# Patient Record
Sex: FEMALE | Race: WHITE | NOT HISPANIC OR LATINO | Employment: FULL TIME | URBAN - METROPOLITAN AREA
[De-identification: names, ages, dates, MRNs, and addresses within clinical notes are randomized per-mention and may not be internally consistent; named-entity substitution may affect disease eponyms.]

---

## 2023-02-19 PROBLEM — N60.19 FIBROCYSTIC BREAST DISEASE (FCBD) IN FEMALE: Status: ACTIVE | Noted: 2023-02-19

## 2023-02-19 PROBLEM — J45.909 ASTHMA (HHS-HCC): Status: ACTIVE | Noted: 2023-02-19

## 2023-02-19 PROBLEM — E55.9 VITAMIN D DEFICIENCY: Status: ACTIVE | Noted: 2023-02-19

## 2023-02-19 PROBLEM — Z87.19 HISTORY OF ESOPHAGEAL STRICTURE: Status: ACTIVE | Noted: 2023-02-19

## 2023-02-19 PROBLEM — I10 ESSENTIAL HYPERTENSION: Status: ACTIVE | Noted: 2023-02-19

## 2023-02-19 PROBLEM — N64.4 BREAST PAIN IN FEMALE: Status: ACTIVE | Noted: 2023-02-19

## 2023-02-19 PROBLEM — N86 CERVICAL ECTROPION: Status: ACTIVE | Noted: 2023-02-19

## 2023-02-19 PROBLEM — E78.5 HYPERLIPIDEMIA: Status: ACTIVE | Noted: 2023-02-19

## 2023-02-19 PROBLEM — N94.12 DEEP DYSPAREUNIA IN FEMALE: Status: ACTIVE | Noted: 2023-02-19

## 2023-02-19 PROBLEM — E66.9 OBESITY, CLASS II, BMI 35-39.9: Status: ACTIVE | Noted: 2023-02-19

## 2023-02-19 PROBLEM — R19.7 DIARRHEA: Status: ACTIVE | Noted: 2023-02-19

## 2023-02-19 PROBLEM — E66.812 OBESITY, CLASS II, BMI 35-39.9: Status: ACTIVE | Noted: 2023-02-19

## 2023-02-19 RX ORDER — PANTOPRAZOLE SODIUM 20 MG/1
TABLET, DELAYED RELEASE ORAL
COMMUNITY
Start: 2021-03-02 | End: 2023-10-11 | Stop reason: SDUPTHER

## 2023-02-19 RX ORDER — ALBUTEROL SULFATE 1.25 MG/3ML
SOLUTION RESPIRATORY (INHALATION)
COMMUNITY

## 2023-02-19 RX ORDER — CETIRIZINE HYDROCHLORIDE 10 MG/1
TABLET ORAL
COMMUNITY

## 2023-02-19 RX ORDER — PNV NO.95/FERROUS FUM/FOLIC AC 28MG-0.8MG
TABLET ORAL
COMMUNITY
Start: 2021-03-02 | End: 2023-08-02 | Stop reason: ALTCHOICE

## 2023-02-19 RX ORDER — NORETHINDRONE 0.35 MG/1
1 TABLET ORAL DAILY
COMMUNITY
Start: 2021-05-05 | End: 2024-05-21

## 2023-02-19 RX ORDER — VIT C/E/ZN/COPPR/LUTEIN/ZEAXAN 250MG-90MG
CAPSULE ORAL
COMMUNITY

## 2023-02-19 RX ORDER — LOSARTAN POTASSIUM 25 MG/1
1 TABLET ORAL DAILY
COMMUNITY
Start: 2022-04-08 | End: 2023-05-15

## 2023-02-19 RX ORDER — CALCIUM CARBONATE 300MG(750)
TABLET,CHEWABLE ORAL
COMMUNITY
Start: 2021-03-02 | End: 2023-08-02 | Stop reason: ALTCHOICE

## 2023-02-19 RX ORDER — ACETAMINOPHEN 325 MG/1
TABLET ORAL
COMMUNITY

## 2023-03-10 LAB
ALANINE AMINOTRANSFERASE (SGPT) (U/L) IN SER/PLAS: 45 U/L (ref 7–45)
ALBUMIN (G/DL) IN SER/PLAS: 4.2 G/DL (ref 3.4–5)
ALKALINE PHOSPHATASE (U/L) IN SER/PLAS: 42 U/L (ref 33–110)
ANION GAP IN SER/PLAS: 11 MMOL/L (ref 10–20)
ASPARTATE AMINOTRANSFERASE (SGOT) (U/L) IN SER/PLAS: 20 U/L (ref 9–39)
BASOPHILS (10*3/UL) IN BLOOD BY AUTOMATED COUNT: 0.05 X10E9/L (ref 0–0.1)
BASOPHILS/100 LEUKOCYTES IN BLOOD BY AUTOMATED COUNT: 0.6 % (ref 0–2)
BILIRUBIN TOTAL (MG/DL) IN SER/PLAS: 0.5 MG/DL (ref 0–1.2)
CALCIDIOL (25 OH VITAMIN D3) (NG/ML) IN SER/PLAS: 99 NG/ML
CALCIUM (MG/DL) IN SER/PLAS: 9.2 MG/DL (ref 8.6–10.3)
CARBON DIOXIDE, TOTAL (MMOL/L) IN SER/PLAS: 24 MMOL/L (ref 21–32)
CHLORIDE (MMOL/L) IN SER/PLAS: 105 MMOL/L (ref 98–107)
CHOLESTEROL (MG/DL) IN SER/PLAS: 195 MG/DL (ref 0–199)
CHOLESTEROL IN HDL (MG/DL) IN SER/PLAS: 39.8 MG/DL
CHOLESTEROL/HDL RATIO: 4.9
CREATININE (MG/DL) IN SER/PLAS: 0.66 MG/DL (ref 0.5–1.05)
EOSINOPHILS (10*3/UL) IN BLOOD BY AUTOMATED COUNT: 0.17 X10E9/L (ref 0–0.7)
EOSINOPHILS/100 LEUKOCYTES IN BLOOD BY AUTOMATED COUNT: 1.9 % (ref 0–6)
ERYTHROCYTE DISTRIBUTION WIDTH (RATIO) BY AUTOMATED COUNT: 12.3 % (ref 11.5–14.5)
ERYTHROCYTE MEAN CORPUSCULAR HEMOGLOBIN CONCENTRATION (G/DL) BY AUTOMATED: 32.8 G/DL (ref 32–36)
ERYTHROCYTE MEAN CORPUSCULAR VOLUME (FL) BY AUTOMATED COUNT: 84 FL (ref 80–100)
ERYTHROCYTES (10*6/UL) IN BLOOD BY AUTOMATED COUNT: 4.46 X10E12/L (ref 4–5.2)
ESTIMATED AVERAGE GLUCOSE FOR HBA1C: 100 MG/DL
GFR FEMALE: >90 ML/MIN/1.73M2
GLUCOSE (MG/DL) IN SER/PLAS: 91 MG/DL (ref 74–99)
HEMATOCRIT (%) IN BLOOD BY AUTOMATED COUNT: 37.5 % (ref 36–46)
HEMOGLOBIN (G/DL) IN BLOOD: 12.3 G/DL (ref 12–16)
HEMOGLOBIN A1C/HEMOGLOBIN TOTAL IN BLOOD: 5.1 %
IMMATURE GRANULOCYTES/100 LEUKOCYTES IN BLOOD BY AUTOMATED COUNT: 0.1 % (ref 0–0.9)
LDL: 136 MG/DL (ref 0–99)
LEUKOCYTES (10*3/UL) IN BLOOD BY AUTOMATED COUNT: 8.8 X10E9/L (ref 4.4–11.3)
LYMPHOCYTES (10*3/UL) IN BLOOD BY AUTOMATED COUNT: 2.46 X10E9/L (ref 1.2–4.8)
LYMPHOCYTES/100 LEUKOCYTES IN BLOOD BY AUTOMATED COUNT: 27.9 % (ref 13–44)
MAGNESIUM (MG/DL) IN SER/PLAS: 1.85 MG/DL (ref 1.6–2.4)
MONOCYTES (10*3/UL) IN BLOOD BY AUTOMATED COUNT: 0.52 X10E9/L (ref 0.1–1)
MONOCYTES/100 LEUKOCYTES IN BLOOD BY AUTOMATED COUNT: 5.9 % (ref 2–10)
NEUTROPHILS (10*3/UL) IN BLOOD BY AUTOMATED COUNT: 5.62 X10E9/L (ref 1.2–7.7)
NEUTROPHILS/100 LEUKOCYTES IN BLOOD BY AUTOMATED COUNT: 63.6 % (ref 40–80)
PLATELETS (10*3/UL) IN BLOOD AUTOMATED COUNT: 430 X10E9/L (ref 150–450)
POTASSIUM (MMOL/L) IN SER/PLAS: 4.2 MMOL/L (ref 3.5–5.3)
PROTEIN TOTAL: 7.4 G/DL (ref 6.4–8.2)
SODIUM (MMOL/L) IN SER/PLAS: 136 MMOL/L (ref 136–145)
THYROTROPIN (MIU/L) IN SER/PLAS BY DETECTION LIMIT <= 0.05 MIU/L: 2.45 MIU/L (ref 0.44–3.98)
TRIGLYCERIDE (MG/DL) IN SER/PLAS: 98 MG/DL (ref 0–149)
UREA NITROGEN (MG/DL) IN SER/PLAS: 11 MG/DL (ref 6–23)
VLDL: 20 MG/DL (ref 0–40)

## 2023-03-13 ENCOUNTER — OFFICE VISIT (OUTPATIENT)
Dept: PRIMARY CARE | Facility: CLINIC | Age: 28
End: 2023-03-13
Payer: COMMERCIAL

## 2023-03-13 VITALS
SYSTOLIC BLOOD PRESSURE: 138 MMHG | OXYGEN SATURATION: 99 % | HEART RATE: 89 BPM | HEIGHT: 64 IN | WEIGHT: 239.4 LBS | DIASTOLIC BLOOD PRESSURE: 83 MMHG | BODY MASS INDEX: 40.87 KG/M2

## 2023-03-13 DIAGNOSIS — J45.20 MILD INTERMITTENT ASTHMA WITHOUT COMPLICATION (HHS-HCC): ICD-10-CM

## 2023-03-13 DIAGNOSIS — E66.9 OBESITY, CLASS II, BMI 35-39.9: ICD-10-CM

## 2023-03-13 DIAGNOSIS — I10 ESSENTIAL HYPERTENSION: ICD-10-CM

## 2023-03-13 DIAGNOSIS — Z00.00 PREVENTATIVE HEALTH CARE: Primary | ICD-10-CM

## 2023-03-13 DIAGNOSIS — E55.9 VITAMIN D DEFICIENCY: ICD-10-CM

## 2023-03-13 DIAGNOSIS — E78.5 HYPERLIPIDEMIA, UNSPECIFIED HYPERLIPIDEMIA TYPE: ICD-10-CM

## 2023-03-13 PROBLEM — R19.7 DIARRHEA: Status: RESOLVED | Noted: 2023-02-19 | Resolved: 2023-03-13

## 2023-03-13 PROCEDURE — 99215 OFFICE O/P EST HI 40 MIN: CPT | Performed by: FAMILY MEDICINE

## 2023-03-13 PROCEDURE — 99395 PREV VISIT EST AGE 18-39: CPT | Performed by: FAMILY MEDICINE

## 2023-03-13 PROCEDURE — 3008F BODY MASS INDEX DOCD: CPT | Performed by: FAMILY MEDICINE

## 2023-03-13 PROCEDURE — 3075F SYST BP GE 130 - 139MM HG: CPT | Performed by: FAMILY MEDICINE

## 2023-03-13 PROCEDURE — 3079F DIAST BP 80-89 MM HG: CPT | Performed by: FAMILY MEDICINE

## 2023-03-13 RX ORDER — PHENTERMINE HYDROCHLORIDE 37.5 MG/1
CAPSULE ORAL
Qty: 30 CAPSULE | Refills: 0 | Status: SHIPPED | OUTPATIENT
Start: 2023-03-13 | End: 2023-04-11 | Stop reason: ENTERED-IN-ERROR

## 2023-03-13 NOTE — PROGRESS NOTES
Pt in today for routine weight management FU and annual preventative.    Denies N/V/D/C, no HA/S/V, denies rashes/lesions, no CP/SOB. Denies fevers/chills.  Positive for bloating.  All other systems were negative.    Gen: NAD  eyes: EOMI, PERRLA  ENT: hearing grossly intact, no nasal discharge  resp: CTABL, without R/R  heart: RRR without MRG  GI: abd: S/ND/NT, BS+  lymph: no axillary, cervical, supraclavicular lymphadenopathy noted   MS: gait grossly WNL,  derm: no rashes or lesions noted  neuro: CN II-XII grossly intact  psych: A&Ox3      ---------  Screening for cervical cancer.  Patient sees OB/GYN.    Screen for hepatitis C was negative December 2021.    Patient is due for annual flu shot.  Has had the COVID series but is due for the bivalent booster. Tdap most recently done 2016. -->>  Recommend you check with your pharmacy to get your flu shot.  ---------    Diarrhea, vomiting, resolved.  Still having bloating.  Was seeing gastroenterology for GERD and ringed esophagitis. Is already on pantoprazole. -->>  If it is bothering you much, call us and I could do a referral to gastroenterology for further evaluation and treatment.  Will refill pantoprazole as needed.    Obesity, last BMI 41, up 13 lb over the last 6 months. Down from a max of around 290.  Patient has had success in the past with phentermine, though she does remember constipation.  Does have Metamucil and Benefiber. -->> keep up the excellent overall work.  Will restart phentermine today.  Recommend starting on half a pill to see if that is good enough and would cause less constipation.  Be very certain you are drinking lots and lots of water.  Also consider MiraLAX or Colace in addition to the Metamucil or Benefiber if the constipation is doing well enough.    We will do annual labs before next appointment: August: Lipid panel and Vitamin D. annual labs next March.  Regarding previous labs:  - Screen for conditions, drug therapy, her A1c is 5.1, so  no sign of diabetes. -->> we will recheck annual labs  - vitamin D deficiency, 99, was 86, 103, 26 on these labs, Goal is , has been taking 100,000 weekly for maybe 6 months.  -->> Continue 100,000 weekly.  We will recheck round August.  - Hyperlipidemia, HDL is 40, was 41, 38, 44, goal is 45 or more.  LDL is 136, was 128, 133, 125, goal is 100 or less. Total to good ratio was 4.9, was 4.6, 4.9.  Is on fish oil 4 daily.  -->> continue to improve diet. (could increase to 4 omega 3 pills (total of 1800mg omega 3s).  We discussed possibly adding a statin or else flush free niacin.  We will consider maybe in a month when we are not changing or adding another new prescription (starting phentermine today).      Hypertension, improved on losartan 25 mg.  Has been checking blood pressures at home, Numbers [id number] at home.  -->> we will refill as needed.     Asthma, doing well without meds.  Has only needed albuterol maybe once  of times over the last 12 months. (worst in the winter). -->> We will follow.      -We will return in about 4 weeks for weight management.

## 2023-04-10 ENCOUNTER — APPOINTMENT (OUTPATIENT)
Dept: PRIMARY CARE | Facility: CLINIC | Age: 28
End: 2023-04-10
Payer: COMMERCIAL

## 2023-04-11 ENCOUNTER — OFFICE VISIT (OUTPATIENT)
Dept: PRIMARY CARE | Facility: CLINIC | Age: 28
End: 2023-04-11
Payer: COMMERCIAL

## 2023-04-11 VITALS
DIASTOLIC BLOOD PRESSURE: 76 MMHG | HEART RATE: 76 BPM | SYSTOLIC BLOOD PRESSURE: 118 MMHG | BODY MASS INDEX: 40 KG/M2 | WEIGHT: 234.3 LBS | OXYGEN SATURATION: 98 % | HEIGHT: 64 IN

## 2023-04-11 DIAGNOSIS — J45.20 MILD INTERMITTENT ASTHMA WITHOUT COMPLICATION (HHS-HCC): ICD-10-CM

## 2023-04-11 DIAGNOSIS — E55.9 VITAMIN D DEFICIENCY: ICD-10-CM

## 2023-04-11 DIAGNOSIS — I10 ESSENTIAL HYPERTENSION: ICD-10-CM

## 2023-04-11 DIAGNOSIS — E66.01 CLASS 3 SEVERE OBESITY WITH BODY MASS INDEX (BMI) OF 40.0 TO 44.9 IN ADULT, UNSPECIFIED OBESITY TYPE, UNSPECIFIED WHETHER SERIOUS COMORBIDITY PRESENT (MULTI): ICD-10-CM

## 2023-04-11 DIAGNOSIS — E78.5 HYPERLIPIDEMIA, UNSPECIFIED HYPERLIPIDEMIA TYPE: ICD-10-CM

## 2023-04-11 PROCEDURE — 99214 OFFICE O/P EST MOD 30 MIN: CPT | Performed by: FAMILY MEDICINE

## 2023-04-11 PROCEDURE — 3008F BODY MASS INDEX DOCD: CPT | Performed by: FAMILY MEDICINE

## 2023-04-11 PROCEDURE — 3074F SYST BP LT 130 MM HG: CPT | Performed by: FAMILY MEDICINE

## 2023-04-11 PROCEDURE — 3078F DIAST BP <80 MM HG: CPT | Performed by: FAMILY MEDICINE

## 2023-04-11 RX ORDER — PHENTERMINE HYDROCHLORIDE 37.5 MG/1
TABLET ORAL
Qty: 30 TABLET | Refills: 0 | Status: SHIPPED | OUTPATIENT
Start: 2023-04-11 | End: 2023-05-09 | Stop reason: SDUPTHER

## 2023-04-11 NOTE — PROGRESS NOTES
Pt in today for weight management FU    Denies N/V/D/C, no HA/S/V, denies rashes/lesions, no CP/SOB. Denies fevers/chills.  Positive for bloating.  All other systems were negative.     Gen: NAD  eyes: EOMI, PERRLA  ENT: hearing grossly intact, no nasal discharge  resp: CTABL, without R/R  heart: RRR without MRG  GI: abd: S/ND/NT, BS+  lymph: no axillary, cervical, supraclavicular lymphadenopathy noted   MS: gait grossly WNL,  derm: no rashes or lesions noted  neuro: CN II-XII grossly intact  psych: A&Ox3        ---------  Screening for cervical cancer.  Patient sees OB/GYN.     Screen for hepatitis C was negative December 2021.     Patient is due for annual flu shot.  Has had the COVID series but is due for the bivalent booster. Tdap most recently done 2016. -->>  Recommend you check with your pharmacy to get your flu shot.  ---------     Next annual preventative visit will be in 2024.    Obesity, last BMI 40, 5 pounds near end of phentermine prescription #1.  Down from a max of around 290.  Patient is controlling the constipation side effect very well with MiraLAX. -->> keep up the excellent overall work. We will refill phentermine No. 2 today.       August: Lipid panel and Vitamin D. annual labs next March.  Regarding previous labs:  - Screen for conditions, drug therapy, her A1c is 5.1, so no sign of diabetes. -->> we will recheck annual labs  - vitamin D deficiency, 99, was 86, 103, 26 on these labs, Goal is , has been taking 100,000 weekly for maybe 6 months.  -->> Continue 100,000 weekly.  We will recheck round August.  - Hyperlipidemia, HDL is 40, was 41, 38, 44, goal is 45 or more.  LDL is 136, was 128, 133, 125, goal is 100 or less. Total to good ratio was 4.9, was 4.6, 4.9.  Is on fish oil 4 daily.  -->> continue to improve diet. (could increase to 4 omega 3 pills (total of 1800mg omega 3s).  Patient is going to add a flush free niacin at this time, start with 500 mg once daily.  After 2 weeks or  more could increase to 2 pills daily.  We will be rechecking around August.     Hypertension, very good today, on losartan 25 mg. -->> we will refill as needed.      Asthma, doing well without meds.  Has only needed albuterol maybe once  of times over the last 12 months. (worst in the winter). -->> We will follow.        - We will return in about 4 weeks (Tuesday) for weight management.

## 2023-04-11 NOTE — PATIENT INSTRUCTIONS
---------  Screening for cervical cancer.  Patient sees OB/GYN.     Screen for hepatitis C was negative December 2021.     Patient is due for annual flu shot.  Has had the COVID series but is due for the bivalent booster. Tdap most recently done 2016. -->>  Recommend you check with your pharmacy to get your flu shot.  ---------     Next annual preventative visit will be in 2024.    Obesity, last BMI 40, 5 pounds near end of phentermine prescription #1.  Down from a max of around 290.  Patient is controlling the constipation side effect very well with MiraLAX. -->> keep up the excellent overall work. We will refill phentermine No. 2 today.       August: Lipid panel and Vitamin D. annual labs next March.  Regarding previous labs:  - Screen for conditions, drug therapy, her A1c is 5.1, so no sign of diabetes. -->> we will recheck annual labs  - vitamin D deficiency, 99, was 86, 103, 26 on these labs, Goal is , has been taking 100,000 weekly for maybe 6 months.  -->> Continue 100,000 weekly.  We will recheck round August.  - Hyperlipidemia, HDL is 40, was 41, 38, 44, goal is 45 or more.  LDL is 136, was 128, 133, 125, goal is 100 or less. Total to good ratio was 4.9, was 4.6, 4.9.  Is on fish oil 4 daily.  -->> continue to improve diet. (could increase to 4 omega 3 pills (total of 1800mg omega 3s).  Patient is going to add a flush free niacin at this time, start with 500 mg once daily.  After 2 weeks or more could increase to 2 pills daily.  We will be rechecking around August.     Hypertension, very good today, on losartan 25 mg. -->> we will refill as needed.      Asthma, doing well without meds.  Has only needed albuterol maybe once  of times over the last 12 months. (worst in the winter). -->> We will follow.        - We will return in about 4 weeks (Tuesday) for weight management.

## 2023-05-09 ENCOUNTER — OFFICE VISIT (OUTPATIENT)
Dept: PRIMARY CARE | Facility: CLINIC | Age: 28
End: 2023-05-09
Payer: COMMERCIAL

## 2023-05-09 VITALS
HEIGHT: 64 IN | DIASTOLIC BLOOD PRESSURE: 86 MMHG | HEART RATE: 83 BPM | OXYGEN SATURATION: 98 % | SYSTOLIC BLOOD PRESSURE: 127 MMHG | WEIGHT: 231.8 LBS | BODY MASS INDEX: 39.57 KG/M2

## 2023-05-09 DIAGNOSIS — I10 ESSENTIAL HYPERTENSION: ICD-10-CM

## 2023-05-09 DIAGNOSIS — J45.20 MILD INTERMITTENT ASTHMA WITHOUT COMPLICATION (HHS-HCC): Primary | ICD-10-CM

## 2023-05-09 DIAGNOSIS — E55.9 VITAMIN D DEFICIENCY: ICD-10-CM

## 2023-05-09 DIAGNOSIS — E66.01 CLASS 3 SEVERE OBESITY WITH BODY MASS INDEX (BMI) OF 40.0 TO 44.9 IN ADULT, UNSPECIFIED OBESITY TYPE, UNSPECIFIED WHETHER SERIOUS COMORBIDITY PRESENT (MULTI): ICD-10-CM

## 2023-05-09 DIAGNOSIS — E66.9 OBESITY, CLASS II, BMI 35-39.9: ICD-10-CM

## 2023-05-09 DIAGNOSIS — E78.5 HYPERLIPIDEMIA, UNSPECIFIED HYPERLIPIDEMIA TYPE: ICD-10-CM

## 2023-05-09 PROBLEM — E66.813 CLASS 3 SEVERE OBESITY WITH BODY MASS INDEX (BMI) OF 40.0 TO 44.9 IN ADULT: Status: ACTIVE | Noted: 2023-05-09

## 2023-05-09 PROCEDURE — 3074F SYST BP LT 130 MM HG: CPT | Performed by: FAMILY MEDICINE

## 2023-05-09 PROCEDURE — 3079F DIAST BP 80-89 MM HG: CPT | Performed by: FAMILY MEDICINE

## 2023-05-09 PROCEDURE — 99215 OFFICE O/P EST HI 40 MIN: CPT | Performed by: FAMILY MEDICINE

## 2023-05-09 PROCEDURE — 3008F BODY MASS INDEX DOCD: CPT | Performed by: FAMILY MEDICINE

## 2023-05-09 RX ORDER — BUPROPION HYDROCHLORIDE 150 MG/1
TABLET ORAL
Qty: 30 TABLET | Refills: 1 | Status: SHIPPED | OUTPATIENT
Start: 2023-05-09 | End: 2023-06-10

## 2023-05-09 RX ORDER — PHENTERMINE HYDROCHLORIDE 37.5 MG/1
TABLET ORAL
Qty: 30 TABLET | Refills: 0 | Status: SHIPPED | OUTPATIENT
Start: 2023-05-09 | End: 2023-08-02 | Stop reason: ALTCHOICE

## 2023-05-09 NOTE — PROGRESS NOTES
Pt in today for routine weight management FU.    Subjective   Patient ID: Karine Ruiz is a 27 y.o. female who presents for Weight Management FU (Pt in today for routine weight management FU.).      Review of Systems  Denies N/V/D/C, no S/V, denies rashes/lesions, no CP/SOB. Denies fevers/chills.  Positive for occasional headaches associated with allergies seasons  All other systems were negative.    Objective   Physical Exam  Gen: NAD  eyes: EOMI, PERRLA  ENT: hearing grossly intact, no nasal discharge  resp: CTABL, without R/R  heart: RRR without MRG  GI: abd: S/ND/NT, BS+  lymph: no axillary, cervical, supraclavicular lymphadenopathy noted   MS: gait grossly WNL,  derm: no rashes or lesions noted  neuro: CN II-XII grossly intact  psych: A&Ox3    Assessment/Plan            Next annual preventative visit will be in 2024.     ---------  Screening for cervical cancer.  Patient sees OB/GYN.     Screen for hepatitis C was negative December 2021.     Patient is due for annual flu shot.  Has had the COVID series but is due for the bivalent booster. Tdap most recently done 2016. -->>  Recommend you check with your pharmacy to get your flu shot.  ---------     Obesity, BMI 39, down another 3 pounds near end of phentermine prescription #2.  Down from a max of around 290. Patient is controlling the constipation side effect very well with MiraLAX. -->> keep up the excellent overall work. We will refill phentermine No. 3 today.  Also started on Wellbutrin 150 mg daily.     August: Lipid panel and Vitamin D. annual labs next March.  Regarding previous labs:  - Screen for conditions, drug therapy, her A1c is 5.1, so no sign of diabetes. -->> we will recheck annual labs  - vitamin D deficiency, 99, was 86, 103, 26 on these labs, Goal is , has been taking 100,000 weekly for maybe 6 months.  -->> Continue 100,000 weekly.  We will recheck round August.  - Hyperlipidemia, HDL is 40, was 41, 38, 44, goal is 45 or more.   LDL is 136, was 128, 133, 125, goal is 100 or less. Total to good ratio was 4.9, was 4.6, 4.9.  Is on fish oil 4 daily and has added 1/free niacin daily since last appointment.-->> continue to improve diet. We will be rechecking around August.      Hypertension, very good today, on losartan 25 mg. -->> we will refill as needed.      Asthma, doing well without meds.  Has only needed albuterol maybe once  of times over the last 12 months. (worst in the winter). -->> We will follow.        - We will return in about 6 weeks (Tuesday) for weight management and lab planning.

## 2023-05-15 DIAGNOSIS — I10 ESSENTIAL (PRIMARY) HYPERTENSION: ICD-10-CM

## 2023-05-15 RX ORDER — LOSARTAN POTASSIUM 25 MG/1
TABLET ORAL
Qty: 90 TABLET | Refills: 1 | Status: SHIPPED | OUTPATIENT
Start: 2023-05-15 | End: 2024-02-01 | Stop reason: SDUPTHER

## 2023-06-06 DIAGNOSIS — E66.01 CLASS 3 SEVERE OBESITY WITH BODY MASS INDEX (BMI) OF 40.0 TO 44.9 IN ADULT, UNSPECIFIED OBESITY TYPE, UNSPECIFIED WHETHER SERIOUS COMORBIDITY PRESENT (MULTI): ICD-10-CM

## 2023-06-10 RX ORDER — BUPROPION HYDROCHLORIDE 150 MG/1
TABLET ORAL
Qty: 30 TABLET | Refills: 1 | Status: SHIPPED | OUTPATIENT
Start: 2023-06-10 | End: 2023-07-12

## 2023-06-20 ENCOUNTER — APPOINTMENT (OUTPATIENT)
Dept: PRIMARY CARE | Facility: CLINIC | Age: 28
End: 2023-06-20
Payer: COMMERCIAL

## 2023-06-29 ENCOUNTER — APPOINTMENT (OUTPATIENT)
Dept: PRIMARY CARE | Facility: CLINIC | Age: 28
End: 2023-06-29
Payer: COMMERCIAL

## 2023-07-12 DIAGNOSIS — E66.01 CLASS 3 SEVERE OBESITY WITH BODY MASS INDEX (BMI) OF 40.0 TO 44.9 IN ADULT, UNSPECIFIED OBESITY TYPE, UNSPECIFIED WHETHER SERIOUS COMORBIDITY PRESENT (MULTI): ICD-10-CM

## 2023-07-12 RX ORDER — BUPROPION HYDROCHLORIDE 150 MG/1
TABLET ORAL
Qty: 30 TABLET | Refills: 1 | Status: SHIPPED | OUTPATIENT
Start: 2023-07-12 | End: 2023-08-02 | Stop reason: SDUPTHER

## 2023-08-02 ENCOUNTER — OFFICE VISIT (OUTPATIENT)
Dept: PRIMARY CARE | Facility: CLINIC | Age: 28
End: 2023-08-02
Payer: COMMERCIAL

## 2023-08-02 VITALS
DIASTOLIC BLOOD PRESSURE: 76 MMHG | BODY MASS INDEX: 40.17 KG/M2 | SYSTOLIC BLOOD PRESSURE: 122 MMHG | WEIGHT: 234 LBS | OXYGEN SATURATION: 100 % | HEART RATE: 85 BPM

## 2023-08-02 DIAGNOSIS — E66.01 CLASS 3 SEVERE OBESITY WITH BODY MASS INDEX (BMI) OF 40.0 TO 44.9 IN ADULT, UNSPECIFIED OBESITY TYPE, UNSPECIFIED WHETHER SERIOUS COMORBIDITY PRESENT (MULTI): ICD-10-CM

## 2023-08-02 DIAGNOSIS — J45.20 MILD INTERMITTENT ASTHMA WITHOUT COMPLICATION (HHS-HCC): ICD-10-CM

## 2023-08-02 DIAGNOSIS — E66.01 MORBID (SEVERE) OBESITY DUE TO EXCESS CALORIES (MULTI): Primary | ICD-10-CM

## 2023-08-02 DIAGNOSIS — E78.5 HYPERLIPIDEMIA, UNSPECIFIED HYPERLIPIDEMIA TYPE: ICD-10-CM

## 2023-08-02 DIAGNOSIS — I10 ESSENTIAL HYPERTENSION: ICD-10-CM

## 2023-08-02 DIAGNOSIS — E55.9 VITAMIN D DEFICIENCY: ICD-10-CM

## 2023-08-02 PROCEDURE — 3074F SYST BP LT 130 MM HG: CPT | Performed by: FAMILY MEDICINE

## 2023-08-02 PROCEDURE — 99214 OFFICE O/P EST MOD 30 MIN: CPT | Performed by: FAMILY MEDICINE

## 2023-08-02 PROCEDURE — 1036F TOBACCO NON-USER: CPT | Performed by: FAMILY MEDICINE

## 2023-08-02 PROCEDURE — 3008F BODY MASS INDEX DOCD: CPT | Performed by: FAMILY MEDICINE

## 2023-08-02 PROCEDURE — 3078F DIAST BP <80 MM HG: CPT | Performed by: FAMILY MEDICINE

## 2023-08-02 RX ORDER — BUPROPION HYDROCHLORIDE 300 MG/1
TABLET ORAL
Qty: 90 TABLET | Refills: 1 | Status: SHIPPED | OUTPATIENT
Start: 2023-08-02

## 2023-08-02 ASSESSMENT — PATIENT HEALTH QUESTIONNAIRE - PHQ9
SUM OF ALL RESPONSES TO PHQ9 QUESTIONS 1 AND 2: 0
2. FEELING DOWN, DEPRESSED OR HOPELESS: NOT AT ALL
1. LITTLE INTEREST OR PLEASURE IN DOING THINGS: NOT AT ALL

## 2023-08-02 NOTE — PATIENT INSTRUCTIONS
Next annual preventative visit will be in 2024.     ---------  Screening for cervical cancer.  Patient sees OB/GYN.     Screen for hepatitis C was negative December 2021.     Patient is due for annual flu shot.  Has had the COVID series but is due for the bivalent booster. Tdap most recently done 2016. -->>  Recommend you check with your pharmacy to get your flu shot.  ---------     Morbid obesity, BMI 40, up 3 pounds. Down from a max of around 290. Patient is controlling the constipation side effect  from phentermine very well with MiraLAX.  needed when not taking phentermine.   Is now on bupropion/Wellbutrin 150 mg daily and has been for about 6 weeks.  Not noticing much benefit. -->> keep up the excellent overall work.   Will increase wellbutrin to 300 mg daily.     August: Lipid panel and Vitamin D. annual labs next March.  Regarding previous labs:  - Screen for conditions, drug therapy, her A1c is 5.1, so no sign of diabetes. -->> we will recheck annual labs  - vitamin D deficiency, 99, was 86, 103, 26 on these labs, Goal is , has been taking 100,000 weekly for maybe 6 months.  -->> Continue 100,000 weekly.  We will recheck round August.  - Hyperlipidemia, HDL is 40, was 41, 38, 44, goal is 45 or more.  LDL is 136, was 128, 133, 125, goal is 100 or less. Total to good ratio was 4.9, was 4.6, 4.9.  Is on fish oil 4 daily and 1 flush free niacin daily since last appointment.-->> continue to improve diet. We will be rechecking around August.      Hypertension, very good today, on losartan 25 mg. -->> we will refill as needed.      Asthma, doing well without meds.  Has only needed albuterol maybe once  of times over the last 12 months. (worst in the winter). -->> We will follow.        - We will return in about 6 weeks (Wednesday first thing in the morning) for weight management and lab review.

## 2023-08-02 NOTE — PROGRESS NOTES
Pt in today for routine weight management FU.    Subjective   Patient ID: Karine Ruiz is a 27 y.o. female who presents for Follow-up (6 week follow up and due for labs).      Review of Systems  Denies N/V/D/C, no HA, S/V, denies rashes/lesions, no CP/SOB. Denies fevers/chills.  All other systems were negative.    Objective   Physical Exam  Gen: NAD  eyes: EOMI, PERRLA  ENT: hearing grossly intact, no nasal discharge  resp: CTABL, without R/R  heart: RRR without MRG  GI: abd: S/ND/NT, BS+  lymph: no axillary, cervical, supraclavicular lymphadenopathy noted   MS: gait grossly WNL,  derm: no rashes or lesions noted  neuro: CN II-XII grossly intact  psych: A&Ox3    Assessment/Plan            Next annual preventative visit will be in 2024.     ---------  Screening for cervical cancer.  Patient sees OB/GYN.     Screen for hepatitis C was negative December 2021.     Patient is due for annual flu shot.  Has had the COVID series but is due for the bivalent booster. Tdap most recently done 2016. -->>  Recommend you check with your pharmacy to get your flu shot.  ---------     Morbid obesity, BMI 40, up 3 pounds. Down from a max of around 290. Patient is controlling the constipation side effect  from phentermine very well with MiraLAX.  needed when not taking phentermine.   Is now on bupropion/Wellbutrin 150 mg daily and has been for about 6 weeks.  Not noticing much benefit. -->> keep up the excellent overall work.   Will increase wellbutrin to 300 mg daily.     August: Lipid panel and Vitamin D. annual labs next March.  Regarding previous labs:  - Screen for conditions, drug therapy, her A1c is 5.1, so no sign of diabetes. -->> we will recheck annual labs  - vitamin D deficiency, 99, was 86, 103, 26 on these labs, Goal is , has been taking 100,000 weekly for maybe 6 months.  -->> Continue 100,000 weekly.  We will recheck round August.  - Hyperlipidemia, HDL is 40, was 41, 38, 44, goal is 45 or more.  LDL is  136, was 128, 133, 125, goal is 100 or less. Total to good ratio was 4.9, was 4.6, 4.9.  Is on fish oil 4 daily and 1 flush free niacin daily since last appointment.-->> continue to improve diet. We will be rechecking around August.      Hypertension, very good today, on losartan 25 mg. -->> we will refill as needed.      Asthma, doing well without meds.  Has only needed albuterol maybe once  of times over the last 12 months. (worst in the winter). -->> We will follow.        - We will return in about 6 weeks (Wednesday first thing in the morning) for weight management and lab review.

## 2023-09-13 ENCOUNTER — APPOINTMENT (OUTPATIENT)
Dept: PRIMARY CARE | Facility: CLINIC | Age: 28
End: 2023-09-13
Payer: COMMERCIAL

## 2023-09-22 ENCOUNTER — APPOINTMENT (OUTPATIENT)
Dept: PRIMARY CARE | Facility: CLINIC | Age: 28
End: 2023-09-22
Payer: COMMERCIAL

## 2023-10-05 ENCOUNTER — LAB (OUTPATIENT)
Dept: LAB | Facility: LAB | Age: 28
End: 2023-10-05
Payer: COMMERCIAL

## 2023-10-05 ENCOUNTER — APPOINTMENT (OUTPATIENT)
Dept: PRIMARY CARE | Facility: CLINIC | Age: 28
End: 2023-10-05
Payer: COMMERCIAL

## 2023-10-05 DIAGNOSIS — E55.9 VITAMIN D DEFICIENCY: ICD-10-CM

## 2023-10-05 DIAGNOSIS — E78.5 HYPERLIPIDEMIA, UNSPECIFIED HYPERLIPIDEMIA TYPE: ICD-10-CM

## 2023-10-05 LAB
25(OH)D3 SERPL-MCNC: 101 NG/ML (ref 30–100)
CHOLEST SERPL-MCNC: 178 MG/DL (ref 0–199)
CHOLESTEROL/HDL RATIO: 4.3
HDLC SERPL-MCNC: 41.1 MG/DL
LDLC SERPL CALC-MCNC: 126 MG/DL (ref 110–150)
NON HDL CHOLESTEROL: 137 MG/DL (ref 0–149)
TRIGL SERPL-MCNC: 54 MG/DL (ref 0–149)
VLDL: 11 MG/DL (ref 0–40)

## 2023-10-05 PROCEDURE — 36415 COLL VENOUS BLD VENIPUNCTURE: CPT

## 2023-10-05 PROCEDURE — 80061 LIPID PANEL: CPT

## 2023-10-05 PROCEDURE — 82306 VITAMIN D 25 HYDROXY: CPT

## 2023-10-11 ENCOUNTER — OFFICE VISIT (OUTPATIENT)
Dept: PRIMARY CARE | Facility: CLINIC | Age: 28
End: 2023-10-11
Payer: COMMERCIAL

## 2023-10-11 VITALS
OXYGEN SATURATION: 100 % | DIASTOLIC BLOOD PRESSURE: 76 MMHG | BODY MASS INDEX: 40.29 KG/M2 | HEIGHT: 64 IN | SYSTOLIC BLOOD PRESSURE: 117 MMHG | HEART RATE: 93 BPM | WEIGHT: 236 LBS

## 2023-10-11 DIAGNOSIS — E55.9 VITAMIN D DEFICIENCY: Primary | ICD-10-CM

## 2023-10-11 DIAGNOSIS — E78.5 HYPERLIPIDEMIA, UNSPECIFIED HYPERLIPIDEMIA TYPE: ICD-10-CM

## 2023-10-11 DIAGNOSIS — K21.9 GASTROESOPHAGEAL REFLUX DISEASE, UNSPECIFIED WHETHER ESOPHAGITIS PRESENT: ICD-10-CM

## 2023-10-11 DIAGNOSIS — E66.9 OBESITY, CLASS II, BMI 35-39.9: ICD-10-CM

## 2023-10-11 DIAGNOSIS — Z79.899 DRUG THERAPY: ICD-10-CM

## 2023-10-11 DIAGNOSIS — Z13.9 SCREENING FOR CONDITION: ICD-10-CM

## 2023-10-11 DIAGNOSIS — I10 ESSENTIAL HYPERTENSION: ICD-10-CM

## 2023-10-11 DIAGNOSIS — J45.20 MILD INTERMITTENT ASTHMA WITHOUT COMPLICATION (HHS-HCC): ICD-10-CM

## 2023-10-11 DIAGNOSIS — Z00.00 PREVENTATIVE HEALTH CARE: ICD-10-CM

## 2023-10-11 PROCEDURE — 1036F TOBACCO NON-USER: CPT | Performed by: FAMILY MEDICINE

## 2023-10-11 PROCEDURE — 99214 OFFICE O/P EST MOD 30 MIN: CPT | Performed by: FAMILY MEDICINE

## 2023-10-11 PROCEDURE — 3008F BODY MASS INDEX DOCD: CPT | Performed by: FAMILY MEDICINE

## 2023-10-11 PROCEDURE — 3074F SYST BP LT 130 MM HG: CPT | Performed by: FAMILY MEDICINE

## 2023-10-11 PROCEDURE — 3078F DIAST BP <80 MM HG: CPT | Performed by: FAMILY MEDICINE

## 2023-10-11 RX ORDER — PANTOPRAZOLE SODIUM 20 MG/1
TABLET, DELAYED RELEASE ORAL
Qty: 90 TABLET | Refills: 1 | Status: SHIPPED | OUTPATIENT
Start: 2023-10-11 | End: 2024-05-30

## 2023-10-11 NOTE — PROGRESS NOTES
Subjective   Patient ID: Karine Ruiz is a 28 y.o. female who presents for 6 Week FU (Pt in today for 6 week FU).    Review of Systems  Denies N/V/D/C, no HA/S/V, denies rashes/lesions, no CP/SOB. Denies fevers/chills.  All other systems were negative.    Objective   Physical Exam  Gen: NAD  eyes: EOMI, PERRLA  ENT: hearing grossly intact, no nasal discharge  resp: CTABL, without R/R  heart: RRR without MRG  GI: abd: S/ND/NT, BS+  lymph: no axillary, cervical, supraclavicular lymphadenopathy noted   MS: gait grossly WNL,  derm: no rashes or lesions noted  neuro: CN II-XII grossly intact  psych: A&Ox3    Assessment/Plan   Diagnoses and all orders for this visit:  Vitamin D deficiency  -     Vitamin D 25-Hydroxy,Total (for eval of Vitamin D levels); Future  Obesity, Class II, BMI 35-39.9  Hyperlipidemia, unspecified hyperlipidemia type  -     Lipid Panel; Future  Essential hypertension  Mild intermittent asthma without complication  Gastroesophageal reflux disease, unspecified whether esophagitis present  -     pantoprazole (ProtoNix) 20 mg EC tablet; 1 by mouth daily to help prevent heartburn.  Drug therapy  -     CBC and Auto Differential; Future  -     Comprehensive Metabolic Panel; Future  -     Magnesium; Future  -     Urinalysis with Reflex Microscopic; Future  Screening for condition  -     TSH with reflex to Free T4 if abnormal; Future  -     Lipid Panel; Future  -     Hemoglobin A1C; Future  Preventative health care  -     CBC and Auto Differential; Future  -     Comprehensive Metabolic Panel; Future  -     TSH with reflex to Free T4 if abnormal; Future  -     Magnesium; Future  -     Lipid Panel; Future  -     Hemoglobin A1C; Future  -     Urinalysis with Reflex Microscopic; Future  -     Vitamin D 25-Hydroxy,Total (for eval of Vitamin D levels); Future         Next annual preventative visit will be in 2024.     ---------  Screening for cervical cancer.  Patient sees OB/GYN.     Screen for hepatitis C  was negative December 2021.     Patient is due for annual flu shot.  Has had the COVID series but is due for the bivalent booster. Tdap most recently done 2016. -->>  Recommend you check with your pharmacy to get your flu shot.  ---------     Morbid obesity, BMI 40, up 2 pounds. Down from a max of around 290.  Has had some success in the past with phentermine, most recently when tried again was not very helpful.  Is now on bupropion/Wellbutrin 300 mg daily.  Patient notes she feels this is at least helping her all do less cravings and less binging. -->> keep up the excellent overall work.    New labs reviewed:  - vitamin D deficiency, 99, was 86, 103, 26 on these labs, Goal is , has been taking 100,000 weekly for maybe 6 months.  -->> Continue 100,000 weekly.  We will recheck 3-6 months.  - Hyperlipidemia, HDL is 41, was 40, 41, 38, 44, goal is 45 or more.  LDL is 126, was 136, 128, 133, 125, goal is 100 or less. Total to good ratio was 4.3, was 4.9, 4.6, 4.9.  Is on fish oil 4 daily and 1 flush free niacin daily since last appointment.-->>  Continue the fish oil, add an extra 1 pill daily of the flush free niacin.  We will recheck with next annual labs.      Regarding previous labs:  - Screen for conditions, drug therapy, A1c is 5.1, so no sign of diabetes. -->> we will recheck annual labs     Hypertension, very good today, on losartan 25 mg. -->> we will refill as needed.      Asthma, doing well without meds.  Has only needed albuterol maybe once  of times over the last 12 months. (worst in the winter). -->> We will follow.     GERD, does well on 20 mg pantoprazole daily.  Gets no heartburn in general when taking it.  When off it gets heartburn maybe twice a week.     - We will return in about 6 months (Thursday first thing in the morning) for annual wellness and annual lab review.   -We will order fasting annual labs for patient to get done at [facility] or any  facility a week before next  appointment.

## 2023-10-11 NOTE — PATIENT INSTRUCTIONS
Next annual preventative visit will be in 2024.     ---------  Screening for cervical cancer.  Patient sees OB/GYN.     Screen for hepatitis C was negative December 2021.     Patient is due for annual flu shot.  Has had the COVID series but is due for the bivalent booster. Tdap most recently done 2016. -->>  Recommend you check with your pharmacy to get your flu shot.  ---------     Morbid obesity, BMI 40, up 2 pounds. Down from a max of around 290.  Has had some success in the past with phentermine, most recently when tried again was not very helpful.  Is now on bupropion/Wellbutrin 300 mg daily.  Patient notes she feels this is at least helping her all do less cravings and less binging. -->> keep up the excellent overall work.    New labs reviewed:  - vitamin D deficiency, 99, was 86, 103, 26 on these labs, Goal is , has been taking 100,000 weekly for maybe 6 months.  -->> Continue 100,000 weekly.  We will recheck 3-6 months.  - Hyperlipidemia, HDL is 41, was 40, 41, 38, 44, goal is 45 or more.  LDL is 126, was 136, 128, 133, 125, goal is 100 or less. Total to good ratio was 4.3, was 4.9, 4.6, 4.9.  Is on fish oil 4 daily and 1 flush free niacin daily since last appointment.-->>  Continue the fish oil, add an extra 1 pill daily of the flush free niacin.  We will recheck with next annual labs.      Regarding previous labs:  - Screen for conditions, drug therapy, A1c is 5.1, so no sign of diabetes. -->> we will recheck annual labs     Hypertension, very good today, on losartan 25 mg. -->> we will refill as needed.      Asthma, doing well without meds.  Has only needed albuterol maybe once  of times over the last 12 months. (worst in the winter). -->> We will follow.     GERD, does well on 20 mg pantoprazole daily.  Gets no heartburn in general when taking it.  When off it gets heartburn maybe twice a week.     - We will return in about 6 months (Thursday first thing in the morning) for annual wellness and  annual lab review.   -We will order fasting annual labs for patient to get done at [facility] or any  facility a week before next appointment.

## 2024-02-01 DIAGNOSIS — I10 ESSENTIAL (PRIMARY) HYPERTENSION: ICD-10-CM

## 2024-02-02 RX ORDER — LOSARTAN POTASSIUM 25 MG/1
25 TABLET ORAL DAILY
Qty: 90 TABLET | Refills: 1 | Status: SHIPPED | OUTPATIENT
Start: 2024-02-02

## 2024-04-11 ENCOUNTER — APPOINTMENT (OUTPATIENT)
Dept: PRIMARY CARE | Facility: CLINIC | Age: 29
End: 2024-04-11
Payer: COMMERCIAL

## 2024-05-21 DIAGNOSIS — Z30.011 ORAL CONTRACEPTIVE PRESCRIBED: ICD-10-CM

## 2024-05-21 DIAGNOSIS — Z30.011 ORAL CONTRACEPTIVE PRESCRIBED: Primary | ICD-10-CM

## 2024-05-21 RX ORDER — NORETHINDRONE 0.35 MG/1
1 TABLET ORAL DAILY
Qty: 84 TABLET | Refills: 1 | Status: SHIPPED | OUTPATIENT
Start: 2024-05-21 | End: 2024-05-22

## 2024-05-22 RX ORDER — NORETHINDRONE 0.35 MG/1
1 TABLET ORAL DAILY
Qty: 84 TABLET | Refills: 3 | Status: SHIPPED | OUTPATIENT
Start: 2024-05-22

## 2024-05-23 ENCOUNTER — APPOINTMENT (OUTPATIENT)
Dept: PRIMARY CARE | Facility: CLINIC | Age: 29
End: 2024-05-23
Payer: COMMERCIAL

## 2024-05-29 DIAGNOSIS — K21.9 GASTROESOPHAGEAL REFLUX DISEASE, UNSPECIFIED WHETHER ESOPHAGITIS PRESENT: ICD-10-CM

## 2024-05-30 RX ORDER — PANTOPRAZOLE SODIUM 20 MG/1
TABLET, DELAYED RELEASE ORAL
Qty: 90 TABLET | Refills: 1 | Status: SHIPPED | OUTPATIENT
Start: 2024-05-30

## 2024-06-26 ENCOUNTER — APPOINTMENT (OUTPATIENT)
Dept: PRIMARY CARE | Facility: CLINIC | Age: 29
End: 2024-06-26
Payer: COMMERCIAL

## 2024-06-28 DIAGNOSIS — I10 ESSENTIAL (PRIMARY) HYPERTENSION: ICD-10-CM

## 2024-06-28 DIAGNOSIS — K21.9 GASTROESOPHAGEAL REFLUX DISEASE, UNSPECIFIED WHETHER ESOPHAGITIS PRESENT: ICD-10-CM

## 2024-07-01 RX ORDER — LOSARTAN POTASSIUM 25 MG/1
25 TABLET ORAL DAILY
Qty: 90 TABLET | Refills: 1 | Status: SHIPPED | OUTPATIENT
Start: 2024-07-01

## 2024-07-01 RX ORDER — PANTOPRAZOLE SODIUM 20 MG/1
TABLET, DELAYED RELEASE ORAL
Refills: 0 | OUTPATIENT
Start: 2024-07-01

## 2024-07-01 RX ORDER — PANTOPRAZOLE SODIUM 20 MG/1
TABLET, DELAYED RELEASE ORAL
Qty: 90 TABLET | Refills: 1 | Status: SHIPPED | OUTPATIENT
Start: 2024-07-01

## 2024-07-01 RX ORDER — LOSARTAN POTASSIUM 25 MG/1
TABLET ORAL
Refills: 0 | OUTPATIENT
Start: 2024-07-01

## 2024-07-19 ENCOUNTER — APPOINTMENT (OUTPATIENT)
Dept: PRIMARY CARE | Facility: CLINIC | Age: 29
End: 2024-07-19
Payer: COMMERCIAL

## 2024-08-15 ENCOUNTER — APPOINTMENT (OUTPATIENT)
Dept: OBSTETRICS AND GYNECOLOGY | Facility: CLINIC | Age: 29
End: 2024-08-15
Payer: COMMERCIAL

## 2024-08-15 VITALS
WEIGHT: 247.4 LBS | HEIGHT: 65 IN | DIASTOLIC BLOOD PRESSURE: 68 MMHG | SYSTOLIC BLOOD PRESSURE: 120 MMHG | BODY MASS INDEX: 41.22 KG/M2

## 2024-08-15 DIAGNOSIS — Z01.419 NORMAL GYNECOLOGIC EXAMINATION: Primary | ICD-10-CM

## 2024-08-15 DIAGNOSIS — Z30.41 ORAL CONTRACEPTIVE PILL SURVEILLANCE: ICD-10-CM

## 2024-08-15 DIAGNOSIS — Z12.4 CERVICAL CANCER SCREENING: ICD-10-CM

## 2024-08-15 DIAGNOSIS — Z31.69 ENCOUNTER FOR PRECONCEPTION CONSULTATION: ICD-10-CM

## 2024-08-15 PROBLEM — N94.12 DEEP DYSPAREUNIA IN FEMALE: Status: RESOLVED | Noted: 2023-02-19 | Resolved: 2024-08-15

## 2024-08-15 PROBLEM — N86 CERVICAL ECTROPION: Status: RESOLVED | Noted: 2023-02-19 | Resolved: 2024-08-15

## 2024-08-15 PROBLEM — N60.19 FIBROCYSTIC BREAST DISEASE (FCBD) IN FEMALE: Status: RESOLVED | Noted: 2023-02-19 | Resolved: 2024-08-15

## 2024-08-15 PROBLEM — N64.4 BREAST PAIN IN FEMALE: Status: RESOLVED | Noted: 2023-02-19 | Resolved: 2024-08-15

## 2024-08-15 PROCEDURE — 87624 HPV HI-RISK TYP POOLED RSLT: CPT

## 2024-08-15 PROCEDURE — 1036F TOBACCO NON-USER: CPT | Performed by: OBSTETRICS & GYNECOLOGY

## 2024-08-15 PROCEDURE — 99395 PREV VISIT EST AGE 18-39: CPT | Performed by: OBSTETRICS & GYNECOLOGY

## 2024-08-15 PROCEDURE — 3074F SYST BP LT 130 MM HG: CPT | Performed by: OBSTETRICS & GYNECOLOGY

## 2024-08-15 PROCEDURE — 3008F BODY MASS INDEX DOCD: CPT | Performed by: OBSTETRICS & GYNECOLOGY

## 2024-08-15 PROCEDURE — 3078F DIAST BP <80 MM HG: CPT | Performed by: OBSTETRICS & GYNECOLOGY

## 2024-08-15 ASSESSMENT — PATIENT HEALTH QUESTIONNAIRE - PHQ9
1. LITTLE INTEREST OR PLEASURE IN DOING THINGS: NOT AT ALL
2. FEELING DOWN, DEPRESSED OR HOPELESS: NOT AT ALL
SUM OF ALL RESPONSES TO PHQ9 QUESTIONS 1 AND 2: 0

## 2024-08-15 ASSESSMENT — ENCOUNTER SYMPTOMS
LOSS OF SENSATION IN FEET: 0
OCCASIONAL FEELINGS OF UNSTEADINESS: 0
DEPRESSION: 0

## 2024-08-15 NOTE — PROGRESS NOTES
"GYNECOLOGY PROGRESS NOTE        CC:    Chief Complaint   Patient presents with    Annual Exam     EST   Pap: 3/2/2021 nml  Abnormal pap hx: No  LMP: 8/13/2024  Cycle: 28-30 day cycle (every 3 months will come a couple of weeks late). Lasting 5-7 days. Back cramps  Contraception: Brianna   Gardasil: 2011- no 3rd dose  Concerns: None    Chaperone: Gia Mayer, CMA           HPI:  Karine Ruiz is here for a routine GYN examination.  No GYN c/o, no AUB.    BC=Brianna.  Possible pregnancy plans in the next 1-2 years.       ROS:  GI - no blood in BMs  URO - no hematuria  GYN - no AUB or vaginal discharge  PSYCH - mood OK        PHYSICAL EXAM:  /68   Ht 1.638 m (5' 4.5\")   Wt 112 kg (247 lb 6.4 oz)   LMP 08/13/2024   BMI 41.81 kg/m²   GEN:  A&O, NAD  ABD:  NT/ND, soft, no palpable masses  URO:  normal urethra, no bladder TTP  GYN:  normal vulva and perineum w/o lesions or ulcers, normal vagina without discharge or lesions, normal cervix without lesions or discharge or CMT, uterus NT/NE, adnexa mobile and NT/NE  BREAST:  no masses or TTP, no skin lesions or nipple discharge  DERM:  no hirsutism or acne   PSYCH:  normal affect, non-anxious      IMPRESSION/PLAN:  Problem List Items Addressed This Visit    None  Visit Diagnoses       Normal gynecologic examination    -  Primary    Cervical cancer screening        Relevant Orders    THINPREP PAP TEST    Oral contraceptive pill surveillance               Pap and HPV done today, last pap wnl in 2021 no Hx of HGSIL, next due in 5 years (2029).   ASCCP pap smear screening guidelines reviewed with the patient.     OCP  use:  Doing well with OCPs for contraception.  Has refills on Rx for Brianna already for 1 year.  On non-estrogen based BCPS due to comorbidities of HTN/HLD/morbid obesity.    Preconceptual counseling:   Patient's medical, surgical, family, and social history reviewed along with current medications.  Pertinent pregnancy issues include HTN with ARB Rx " use, obesity, .   Preconceptual use of OTC MVI with folic acid (at least 400 µg of folic acid) more recommended 2 months prior to conception.  Recommend all vaccinations be UTD prior to pregnancy--including flu, Covid, and chickenpox (if not previously immunized or infected).  No FMHx of Cystic Fibrosis, Sickle Cell Disease/Trait, or birth defects.  BMI noted, weight optimization of weight prior to pregnancy for prevention of GDM and pregnancy related HTN disorders.  Counseled patient that I am no longer doing inpatient OB, referral to another OB physician in practice will be given for future OB care (had HTN and is on Rx--unlikely to be CNM candidate).  Directed patient to discuss her HTN Rx Cozaar with her PCP and recommend changing over before pregnancy to a safer Rx for pregnant patients such as a calcium channel blocker or beta-blocker        Edgar Ferreira, DO

## 2024-08-29 LAB
CYTOLOGY CMNT CVX/VAG CYTO-IMP: NORMAL
HPV HR 12 DNA GENITAL QL NAA+PROBE: NEGATIVE
HPV HR GENOTYPES PNL CVX NAA+PROBE: NEGATIVE
HPV16 DNA SPEC QL NAA+PROBE: NEGATIVE
HPV18 DNA SPEC QL NAA+PROBE: NEGATIVE
LAB AP HPV GENOTYPE QUESTION: YES
LAB AP HPV HR: NORMAL
LABORATORY COMMENT REPORT: NORMAL
LABORATORY COMMENT REPORT: NORMAL
PATH REPORT.TOTAL CANCER: NORMAL

## 2024-11-07 ENCOUNTER — APPOINTMENT (OUTPATIENT)
Dept: PRIMARY CARE | Facility: CLINIC | Age: 29
End: 2024-11-07
Payer: COMMERCIAL

## 2024-11-07 VITALS
BODY MASS INDEX: 40.8 KG/M2 | DIASTOLIC BLOOD PRESSURE: 95 MMHG | HEIGHT: 64 IN | OXYGEN SATURATION: 97 % | HEART RATE: 108 BPM | SYSTOLIC BLOOD PRESSURE: 156 MMHG | WEIGHT: 239 LBS

## 2024-11-07 DIAGNOSIS — J45.20 MILD INTERMITTENT ASTHMA WITHOUT COMPLICATION (HHS-HCC): ICD-10-CM

## 2024-11-07 DIAGNOSIS — Z13.9 SCREENING FOR CONDITION: ICD-10-CM

## 2024-11-07 DIAGNOSIS — I10 HYPERTENSION, UNSPECIFIED TYPE: Primary | ICD-10-CM

## 2024-11-07 DIAGNOSIS — E55.9 VITAMIN D DEFICIENCY: ICD-10-CM

## 2024-11-07 DIAGNOSIS — Z79.899 DRUG THERAPY: ICD-10-CM

## 2024-11-07 DIAGNOSIS — J45.990 EXERCISE-INDUCED ASTHMA (HHS-HCC): ICD-10-CM

## 2024-11-07 LAB
25(OH)D3 SERPL-MCNC: >120 NG/ML (ref 30–100)
ALBUMIN SERPL BCP-MCNC: 4.7 G/DL (ref 3.4–5)
ALP SERPL-CCNC: 48 U/L (ref 33–110)
ALT SERPL W P-5'-P-CCNC: 22 U/L (ref 7–45)
ANION GAP SERPL CALC-SCNC: 13 MMOL/L (ref 10–20)
APPEARANCE UR: ABNORMAL
AST SERPL W P-5'-P-CCNC: 13 U/L (ref 9–39)
BACTERIA #/AREA URNS AUTO: ABNORMAL /HPF
BASOPHILS # BLD AUTO: 0.06 X10*3/UL (ref 0–0.1)
BASOPHILS NFR BLD AUTO: 0.7 %
BILIRUB SERPL-MCNC: 0.6 MG/DL (ref 0–1.2)
BILIRUB UR STRIP.AUTO-MCNC: NEGATIVE MG/DL
BUN SERPL-MCNC: 11 MG/DL (ref 6–23)
CALCIUM SERPL-MCNC: 9.9 MG/DL (ref 8.6–10.3)
CHLORIDE SERPL-SCNC: 102 MMOL/L (ref 98–107)
CHOLEST SERPL-MCNC: 212 MG/DL (ref 0–199)
CHOLESTEROL/HDL RATIO: 5.2
CO2 SERPL-SCNC: 27 MMOL/L (ref 21–32)
COLOR UR: ABNORMAL
CREAT SERPL-MCNC: 0.75 MG/DL (ref 0.5–1.05)
EGFRCR SERPLBLD CKD-EPI 2021: >90 ML/MIN/1.73M*2
EOSINOPHIL # BLD AUTO: 0.15 X10*3/UL (ref 0–0.7)
EOSINOPHIL NFR BLD AUTO: 1.6 %
ERYTHROCYTE [DISTWIDTH] IN BLOOD BY AUTOMATED COUNT: 12.2 % (ref 11.5–14.5)
EST. AVERAGE GLUCOSE BLD GHB EST-MCNC: 100 MG/DL
GLUCOSE SERPL-MCNC: 90 MG/DL (ref 74–99)
GLUCOSE UR STRIP.AUTO-MCNC: NORMAL MG/DL
HBA1C MFR BLD: 5.1 %
HCT VFR BLD AUTO: 42.7 % (ref 36–46)
HDLC SERPL-MCNC: 40.9 MG/DL
HGB BLD-MCNC: 14 G/DL (ref 12–16)
IMM GRANULOCYTES # BLD AUTO: 0.03 X10*3/UL (ref 0–0.7)
IMM GRANULOCYTES NFR BLD AUTO: 0.3 % (ref 0–0.9)
KETONES UR STRIP.AUTO-MCNC: NEGATIVE MG/DL
LDLC SERPL CALC-MCNC: 148 MG/DL
LEUKOCYTE ESTERASE UR QL STRIP.AUTO: ABNORMAL
LYMPHOCYTES # BLD AUTO: 2.47 X10*3/UL (ref 1.2–4.8)
LYMPHOCYTES NFR BLD AUTO: 27.1 %
MAGNESIUM SERPL-MCNC: 1.94 MG/DL (ref 1.6–2.4)
MCH RBC QN AUTO: 27.5 PG (ref 26–34)
MCHC RBC AUTO-ENTMCNC: 32.8 G/DL (ref 32–36)
MCV RBC AUTO: 84 FL (ref 80–100)
MONOCYTES # BLD AUTO: 0.6 X10*3/UL (ref 0.1–1)
MONOCYTES NFR BLD AUTO: 6.6 %
MUCOUS THREADS #/AREA URNS AUTO: ABNORMAL /LPF
NEUTROPHILS # BLD AUTO: 5.81 X10*3/UL (ref 1.2–7.7)
NEUTROPHILS NFR BLD AUTO: 63.7 %
NITRITE UR QL STRIP.AUTO: NEGATIVE
NON HDL CHOLESTEROL: 171 MG/DL (ref 0–149)
NRBC BLD-RTO: 0 /100 WBCS (ref 0–0)
PH UR STRIP.AUTO: 7 [PH]
PLATELET # BLD AUTO: 513 X10*3/UL (ref 150–450)
POTASSIUM SERPL-SCNC: 4.4 MMOL/L (ref 3.5–5.3)
PROT SERPL-MCNC: 7.6 G/DL (ref 6.4–8.2)
PROT UR STRIP.AUTO-MCNC: NEGATIVE MG/DL
RBC # BLD AUTO: 5.1 X10*6/UL (ref 4–5.2)
RBC # UR STRIP.AUTO: NEGATIVE /UL
RBC #/AREA URNS AUTO: ABNORMAL /HPF
SODIUM SERPL-SCNC: 138 MMOL/L (ref 136–145)
SP GR UR STRIP.AUTO: 1.01
SQUAMOUS #/AREA URNS AUTO: ABNORMAL /HPF
TRIGL SERPL-MCNC: 115 MG/DL (ref 0–149)
TSH SERPL-ACNC: 2.25 MIU/L (ref 0.44–3.98)
UROBILINOGEN UR STRIP.AUTO-MCNC: NORMAL MG/DL
VLDL: 23 MG/DL (ref 0–40)
WBC # BLD AUTO: 9.1 X10*3/UL (ref 4.4–11.3)
WBC #/AREA URNS AUTO: ABNORMAL /HPF

## 2024-11-07 PROCEDURE — 85025 COMPLETE CBC W/AUTO DIFF WBC: CPT

## 2024-11-07 PROCEDURE — 3077F SYST BP >= 140 MM HG: CPT | Performed by: FAMILY MEDICINE

## 2024-11-07 PROCEDURE — 80061 LIPID PANEL: CPT

## 2024-11-07 PROCEDURE — 99395 PREV VISIT EST AGE 18-39: CPT | Performed by: FAMILY MEDICINE

## 2024-11-07 PROCEDURE — 99214 OFFICE O/P EST MOD 30 MIN: CPT | Performed by: FAMILY MEDICINE

## 2024-11-07 PROCEDURE — 83735 ASSAY OF MAGNESIUM: CPT

## 2024-11-07 PROCEDURE — 84443 ASSAY THYROID STIM HORMONE: CPT

## 2024-11-07 PROCEDURE — 3080F DIAST BP >= 90 MM HG: CPT | Performed by: FAMILY MEDICINE

## 2024-11-07 PROCEDURE — 82306 VITAMIN D 25 HYDROXY: CPT

## 2024-11-07 PROCEDURE — 3008F BODY MASS INDEX DOCD: CPT | Performed by: FAMILY MEDICINE

## 2024-11-07 PROCEDURE — 83036 HEMOGLOBIN GLYCOSYLATED A1C: CPT

## 2024-11-07 PROCEDURE — 80053 COMPREHEN METABOLIC PANEL: CPT

## 2024-11-07 PROCEDURE — 81001 URINALYSIS AUTO W/SCOPE: CPT

## 2024-11-07 RX ORDER — LABETALOL 100 MG/1
TABLET, FILM COATED ORAL
Qty: 90 TABLET | Refills: 1 | Status: SHIPPED | OUTPATIENT
Start: 2024-11-07

## 2024-11-07 RX ORDER — ALBUTEROL SULFATE AND BUDESONIDE 90; 80 UG/1; UG/1
AEROSOL, METERED RESPIRATORY (INHALATION)
Qty: 10.7 G | Refills: 11 | Status: SHIPPED | OUTPATIENT
Start: 2024-11-07

## 2024-11-07 NOTE — PROGRESS NOTES
Subjective   Patient ID: Karine Ruiz is a 29 y.o. female who presents for Follow-up (Preventative).    Review of Systems  Denies N/V/D/C, no HA/S/V, denies rashes/lesions, no CP/SOB. Denies fevers/chills.  All other systems were negative.    Objective   Physical Exam  Gen: NAD  eyes: EOMI, PERRLA  ENT: hearing grossly intact, no nasal discharge  resp: CTABL, without R/R  heart: RRR without MRG  GI: abd: S/ND/NT, BS+  lymph: no axillary, cervical, supraclavicular lymphadenopathy noted   MS: gait grossly WNL,  derm: no rashes or lesions noted  neuro: CN II-XII grossly intact  psych: A&Ox3    Assessment/Plan   There are no diagnoses linked to this encounter.         Today doing a preventative visit.  Annual preventative visit will be in 2025.     ---------    Screening for cervical cancer.  Patient sees OB/GYN.     Screen for hepatitis C was negative December 2021.     Immunization counseling: Patient is due for annual flu shot.  Has had the COVID series but is due for the bivalent booster. Tdap most recently done 2016. -->>  Recommend you check with your pharmacy to get your flu shot.    ---------     Morbid obesity, BMI 41, the same as a year ago.  Still down from a max of around 290.  Has had some success in the past with phentermine was discontinued due to insomnia.  Failed Wellbutrin ineffective.  Denies any family history of thyroid cancer.  Declines offers for consideration of any injectables. -->> keep up the excellent overall work.    Regarding previous labs:    - vitamin D deficiency, 99, was 86, 103, 26 on these labs, Goal is , has been taking 100,000 weekly for maybe 6 months.  -->> Continue 100,000 weekly.  We will recheck 3-6 months.    - Hyperlipidemia, HDL is 41, was 40, 41, 38, 44, goal is 45 or more.  LDL is 126, was 136, 128, 133, 125, goal is 100 or less. Total to good ratio was 4.3, was 4.9, 4.6, 4.9.  Is on fish oil 4 daily and 1 flush free niacin daily since last appointment.-->>   Continue the fish oil, add an extra 1 pill daily of the flush free niacin.  We will recheck.      - Screen for conditions, drug therapy, A1c is 5.1, so no sign of diabetes. -->> we will recheck annual labs     Hypertension, elevated today.  On losartan 25 mg.  Not been checking recently.  Is planning to get pregnant so we will switch to labetalol. -->> we will refill as needed.      Asthma, along with exercise-induced asthma, doing well without meds.  Has only needed albuterol maybe once  of times over the last 12 months. (worst in the winter).  Generally uses albuterol before going to the gym. -->> We will follow.  Will refill as needed.  I will prescribe Airsupra to see if it is covered as it in general is considered better than albuterol for asthma.     GERD, does well on 20 mg pantoprazole daily.  Gets no heartburn in general when taking it.  When off it gets heartburn maybe twice a week. -->>  Ask your OB/GYN if they have     - Draw fasting annual labs today.  - Will schedule virtual lab review appointment in about a month to go over results.

## 2024-11-07 NOTE — PATIENT INSTRUCTIONS
Today doing a preventative visit.  Annual preventative visit will be in 2025.     ---------    Screening for cervical cancer.  Patient sees OB/GYN.     Screen for hepatitis C was negative December 2021.     Immunization counseling: Patient is due for annual flu shot.  Has had the COVID series but is due for the bivalent booster. Tdap most recently done 2016. -->>  Recommend you check with your pharmacy to get your flu shot.    ---------     Morbid obesity, BMI 41, the same as a year ago.  Still down from a max of around 290.  Has had some success in the past with phentermine was discontinued due to insomnia.  Failed Wellbutrin ineffective.  Denies any family history of thyroid cancer.  Declines offers for consideration of any injectables. -->> keep up the excellent overall work.    Regarding previous labs:    - vitamin D deficiency, 99, was 86, 103, 26 on these labs, Goal is , has been taking 100,000 weekly for maybe 6 months.  -->> Continue 100,000 weekly.  We will recheck 3-6 months.    - Hyperlipidemia, HDL is 41, was 40, 41, 38, 44, goal is 45 or more.  LDL is 126, was 136, 128, 133, 125, goal is 100 or less. Total to good ratio was 4.3, was 4.9, 4.6, 4.9.  Is on fish oil 4 daily and 1 flush free niacin daily since last appointment.-->>  Continue the fish oil, add an extra 1 pill daily of the flush free niacin.  We will recheck.      - Screen for conditions, drug therapy, A1c is 5.1, so no sign of diabetes. -->> we will recheck annual labs     Hypertension, elevated today.  On losartan 25 mg.  Not been checking recently.  Is planning to get pregnant so we will switch to labetalol. -->> we will refill as needed.      Asthma, along with exercise-induced asthma, doing well without meds.  Has only needed albuterol maybe once  of times over the last 12 months. (worst in the winter).  Generally uses albuterol before going to the gym. -->> We will follow.  Will refill as needed.  I will prescribe Airsupra to see  if it is covered as it in general is considered better than albuterol for asthma.     GERD, does well on 20 mg pantoprazole daily.  Gets no heartburn in general when taking it.  When off it gets heartburn maybe twice a week. -->>  Ask your OB/GYN if they have     - Draw fasting annual labs today.  - Will schedule virtual lab review appointment in about a month to go over results.

## 2024-12-02 ENCOUNTER — TELEPHONE (OUTPATIENT)
Dept: OBSTETRICS AND GYNECOLOGY | Facility: CLINIC | Age: 29
End: 2024-12-02

## 2024-12-02 NOTE — TELEPHONE ENCOUNTER
Established NOB called ob line c/o bleeding during and directly following intercourse, the last several times they have had intercourse.  She states that directly following intercourse she has to sit on the toilet for about 10 minutes and then bleeding stops all together.  Patient is also having lower pelvic cramping but super mild, almost feels like ovulation cramping.    LMP 11/5, should be about 3.6 weeks today.    Patient encouraged to avoid intercourse until she has her NOB on 12/23 with Dr Thayer.  She was assured that bleeding in the first part of pregnancy is not uncommon, and is very common to have light bleeding/spotting after intercourse when pregnant.    She should monitor her spotting/bleeding for now and call back is she starts having heavy bleeding, where she is saturating a pad every 1-2 hours with intense cramping or pain.    Patient assured I will forward message onto Dr Thayer to make her aware as well    No further questions

## 2024-12-05 ENCOUNTER — APPOINTMENT (OUTPATIENT)
Dept: PRIMARY CARE | Facility: CLINIC | Age: 29
End: 2024-12-05
Payer: COMMERCIAL

## 2024-12-05 DIAGNOSIS — Z13.9 SCREENING FOR CONDITION: ICD-10-CM

## 2024-12-05 DIAGNOSIS — I10 HYPERTENSION, UNSPECIFIED TYPE: ICD-10-CM

## 2024-12-05 DIAGNOSIS — K21.9 GASTROESOPHAGEAL REFLUX DISEASE, UNSPECIFIED WHETHER ESOPHAGITIS PRESENT: ICD-10-CM

## 2024-12-05 DIAGNOSIS — E55.9 VITAMIN D DEFICIENCY: Primary | ICD-10-CM

## 2024-12-05 DIAGNOSIS — E66.812 OBESITY, CLASS II, BMI 35-39.9: ICD-10-CM

## 2024-12-05 DIAGNOSIS — E78.5 HYPERLIPIDEMIA, UNSPECIFIED HYPERLIPIDEMIA TYPE: ICD-10-CM

## 2024-12-05 DIAGNOSIS — R79.89 ELEVATED PLATELET COUNT: ICD-10-CM

## 2024-12-05 DIAGNOSIS — I10 ESSENTIAL HYPERTENSION: ICD-10-CM

## 2024-12-05 DIAGNOSIS — J45.990 EXERCISE-INDUCED ASTHMA (HHS-HCC): ICD-10-CM

## 2024-12-05 DIAGNOSIS — Z79.899 DRUG THERAPY: ICD-10-CM

## 2024-12-05 PROCEDURE — 99214 OFFICE O/P EST MOD 30 MIN: CPT | Performed by: FAMILY MEDICINE

## 2024-12-05 NOTE — PATIENT INSTRUCTIONS
Annual preventative visit will be in 2025.     ---------    Screening for cervical cancer.  Patient sees OB/GYN.     Screen for hepatitis C was negative December 2021.     Immunization counseling: Patient is due for annual flu shot.  Has had the COVID series but is due for the bivalent booster. Tdap most recently done 2016. -->>  Recommend you check with your pharmacy to get your flu shot.    ---------     Morbid obesity, BMI 41, the same as a year ago.  Still down from a max of around 290.  Has had some success in the past with phentermine was discontinued due to insomnia.  Failed Wellbutrin ineffective.  Denies any family history of thyroid cancer.  Declines offers for consideration of any injectables. -->> keep up the excellent overall work.    New annual labs reviewed:     - vitamin D deficiency, >120, was 101, 99, 86, 103, 26 on these labs, Goal is , has been taking 100,000 weekly for maybe 6 months.  -->>  Hold off on vitamin D until we get a value that is acceptable and then we will tell you to restart.  Next labs in about 3 months.  We will recheck 3 months.    - Hyperlipidemia, , goal is less than 100.  Total to good ratio is 5.2, goal is less than 3.4.  Over 5 counts as a high risk factor. Is on fish oil 4 daily and 1 flush free niacin daily since last appointment.  Has been holding the niacin until discussing with OB/GYN. -->> Continue the fish oil and continue niacin if OB/GYN says to.  Recheck with next annual labs.    - Screen for conditions, drug therapy, A1c is 5.1, so no sign of diabetes. -->> we will recheck annual labs     -Elevated white blood cells in urinalysis.  11-20 WBC per hpf on microscopic.  No dysuria or other symptoms of UTI. -->>  Will recheck with next annual labs.    -Elevated platelets, 513.  Will recheck with labs in a couple months.    Hypertension, not checked today, virtual appointment.  On labetalol.  Losartan was discontinued.  Not been checking recently.  -->> we  will refill as needed.      Asthma, along with exercise-induced asthma, doing well without meds.  Has only needed albuterol maybe once  of times over the last 12 months. (worst in the winter).  Generally uses albuterol before going to the gym.  Now has Airsupra to use in its place.  Not needed it since it has been prescribed. -->> Will refill as needed.        GERD, does well on 20 mg pantoprazole daily.  Gets no heartburn in general when taking it.  When off it gets heartburn maybe twice a week. -->>  Ask your OB/GYN about pantoprazole.       - Will return in about 2 months first thing in the morning on a Thursday for annual preventative visit and lab review.    -Order vitamin D and cbcD lab for you to get at least a few days before next appointment at any  facility that you like.

## 2024-12-05 NOTE — PROGRESS NOTES
Subjective   Patient ID: Karine Ruiz is a 29 y.o. female who presents for Virtual Visit (Pt being seen virtually for lab review FU).    Review of Systems  Denies N/V/D/C, no HA/S/V, denies rashes/lesions, no CP/SOB. Denies fevers/chills.  All other systems were negative.    Objective   Physical Exam  Gen: NAD  eyes: EOMI, PERRLA  ENT: hearing grossly intact, no nasal discharge  resp: CTABL, without R/R  heart: RRR without MRG  GI: abd: S/ND/NT, BS+  lymph: no axillary, cervical, supraclavicular lymphadenopathy noted   MS: gait grossly WNL,  derm: no rashes or lesions noted  neuro: CN II-XII grossly intact  psych: A&Ox3    Assessment/Plan   There are no diagnoses linked to this encounter.         Annual preventative visit will be in 2025.     ---------    Screening for cervical cancer.  Patient sees OB/GYN.     Screen for hepatitis C was negative December 2021.     Immunization counseling: Patient is due for annual flu shot.  Has had the COVID series but is due for the bivalent booster. Tdap most recently done 2016. -->>  Recommend you check with your pharmacy to get your flu shot.    ---------     Morbid obesity, BMI 41, the same as a year ago.  Still down from a max of around 290.  Has had some success in the past with phentermine was discontinued due to insomnia.  Failed Wellbutrin ineffective.  Denies any family history of thyroid cancer.  Declines offers for consideration of any injectables. -->> keep up the excellent overall work.    New annual labs reviewed:     - vitamin D deficiency, >120, was 101, 99, 86, 103, 26 on these labs, Goal is , has been taking 100,000 weekly for maybe 6 months.  -->>  Hold off on vitamin D until we get a value that is acceptable and then we will tell you to restart.  Next labs in about 3 months.  We will recheck 3 months.    - Hyperlipidemia, , goal is less than 100.  Total to good ratio is 5.2, goal is less than 3.4.  Over 5 counts as a high risk factor. Is  on fish oil 4 daily and 1 flush free niacin daily since last appointment.  Has been holding the niacin until discussing with OB/GYN. -->> Continue the fish oil and continue niacin if OB/GYN says to.  Recheck with next annual labs.    - Screen for conditions, drug therapy, A1c is 5.1, so no sign of diabetes. -->> we will recheck annual labs     -Elevated white blood cells in urinalysis.  11-20 WBC per hpf on microscopic.  No dysuria or other symptoms of UTI. -->>  Will recheck with next annual labs.    -Elevated platelets, 513.  Will recheck with labs in a couple months.    Hypertension, not checked today, virtual appointment.  On labetalol.  Losartan was discontinued.  Not been checking recently.  -->> we will refill as needed.      Asthma, along with exercise-induced asthma, doing well without meds.  Has only needed albuterol maybe once  of times over the last 12 months. (worst in the winter).  Generally uses albuterol before going to the gym.  Now has Airsupra to use in its place.  Not needed it since it has been prescribed. -->> Will refill as needed.        GERD, does well on 20 mg pantoprazole daily.  Gets no heartburn in general when taking it.  When off it gets heartburn maybe twice a week. -->>  Ask your OB/GYN about pantoprazole.       - Will return in about 2 months first thing in the morning on a Thursday for annual preventative visit and lab review.    -Order vitamin D and cbcD lab for you to get at least a few days before next appointment at any  facility that you like.

## 2024-12-23 ENCOUNTER — APPOINTMENT (OUTPATIENT)
Facility: CLINIC | Age: 29
End: 2024-12-23
Payer: COMMERCIAL

## 2024-12-23 ENCOUNTER — LAB (OUTPATIENT)
Dept: LAB | Facility: LAB | Age: 29
End: 2024-12-23
Payer: COMMERCIAL

## 2024-12-23 VITALS — SYSTOLIC BLOOD PRESSURE: 139 MMHG | WEIGHT: 244 LBS | DIASTOLIC BLOOD PRESSURE: 84 MMHG | BODY MASS INDEX: 41.88 KG/M2

## 2024-12-23 DIAGNOSIS — Z3A.01 7 WEEKS GESTATION OF PREGNANCY (HHS-HCC): Primary | ICD-10-CM

## 2024-12-23 DIAGNOSIS — K21.9 GASTROESOPHAGEAL REFLUX DISEASE WITHOUT ESOPHAGITIS: ICD-10-CM

## 2024-12-23 DIAGNOSIS — E66.01 CLASS 3 SEVERE OBESITY DUE TO EXCESS CALORIES WITHOUT SERIOUS COMORBIDITY WITH BODY MASS INDEX (BMI) OF 40.0 TO 44.9 IN ADULT: ICD-10-CM

## 2024-12-23 DIAGNOSIS — Z34.91 PRENATAL CARE IN FIRST TRIMESTER (HHS-HCC): ICD-10-CM

## 2024-12-23 DIAGNOSIS — I10 ESSENTIAL HYPERTENSION: ICD-10-CM

## 2024-12-23 DIAGNOSIS — J45.990 EXERCISE-INDUCED ASTHMA (HHS-HCC): ICD-10-CM

## 2024-12-23 DIAGNOSIS — E66.813 CLASS 3 SEVERE OBESITY DUE TO EXCESS CALORIES WITHOUT SERIOUS COMORBIDITY WITH BODY MASS INDEX (BMI) OF 40.0 TO 44.9 IN ADULT: ICD-10-CM

## 2024-12-23 PROBLEM — J45.909 ASTHMA: Status: RESOLVED | Noted: 2023-02-19 | Resolved: 2024-12-23

## 2024-12-23 PROBLEM — Z30.41 ORAL CONTRACEPTIVE PILL SURVEILLANCE: Status: RESOLVED | Noted: 2024-08-15 | Resolved: 2024-12-23

## 2024-12-23 PROBLEM — Z13.9 SCREENING FOR CONDITION: Status: RESOLVED | Noted: 2024-12-05 | Resolved: 2024-12-23

## 2024-12-23 PROBLEM — Z01.419 NORMAL GYNECOLOGIC EXAMINATION: Status: RESOLVED | Noted: 2024-08-15 | Resolved: 2024-12-23

## 2024-12-23 PROBLEM — E78.5 HYPERLIPIDEMIA: Status: RESOLVED | Noted: 2023-02-19 | Resolved: 2024-12-23

## 2024-12-23 PROBLEM — E55.9 VITAMIN D DEFICIENCY: Status: RESOLVED | Noted: 2023-02-19 | Resolved: 2024-12-23

## 2024-12-23 PROBLEM — Z79.899 DRUG THERAPY: Status: RESOLVED | Noted: 2024-12-05 | Resolved: 2024-12-23

## 2024-12-23 PROBLEM — E66.812 OBESITY, CLASS II, BMI 35-39.9: Status: RESOLVED | Noted: 2023-02-19 | Resolved: 2024-12-23

## 2024-12-23 LAB
CREAT UR-MCNC: 26.9 MG/DL (ref 20–320)
PROT UR-ACNC: <4 MG/DL (ref 5–24)
PROT/CREAT UR: ABNORMAL MG/G{CREAT}

## 2024-12-23 PROCEDURE — 82570 ASSAY OF URINE CREATININE: CPT

## 2024-12-23 PROCEDURE — 87086 URINE CULTURE/COLONY COUNT: CPT

## 2024-12-23 PROCEDURE — 0500F INITIAL PRENATAL CARE VISIT: CPT | Performed by: OBSTETRICS & GYNECOLOGY

## 2024-12-23 PROCEDURE — 87491 CHLMYD TRACH DNA AMP PROBE: CPT

## 2024-12-23 PROCEDURE — 87591 N.GONORRHOEAE DNA AMP PROB: CPT

## 2024-12-23 PROCEDURE — 84156 ASSAY OF PROTEIN URINE: CPT

## 2024-12-23 NOTE — PROGRESS NOTES
at 6w 6d with sure LMP    Single live IUP seen today.  We discussed genetic screening in pregnancy.  Patient agrees and blood work and 12 wk ultrasound information was given to her. To get OB labs next visit       Medical Problems       Problem List       Essential hypertension    Overview Addendum 2024 11:11 AM by Gaby Thayer MD     CHTN on Labetalol 200 BID  To check home BPs weekly goal is 140-150/  Pt aware of incr risk Preeclampsia / had it last preg at 39 weeks  PLAN:  32w NST or BPP weekly , deliver at 37 w   Growth scans at 28w, 32w,  36 w           Class 3 severe obesity with body mass index (BMI) of 40.0 to 44.9 in adult    Overview Signed 2024 11:11 AM by Gaby Thayer MD     Healthy diet discussed - wt gain goal 20 lbs max          Gastroesophageal reflux disease    Overview Signed 2024 10:53 AM by Gaby Thayer MD     On Protonix - safe in preg         Exercise-induced asthma ([facility])    Overview Signed 2024 11:11 AM by Gaby Thayer MD     Has albuterol inhaler prn          Elevated platelet count

## 2024-12-24 LAB
BACTERIA UR CULT: NORMAL
C TRACH RRNA SPEC QL NAA+PROBE: NEGATIVE
N GONORRHOEA DNA SPEC QL PROBE+SIG AMP: NEGATIVE

## 2025-01-20 ENCOUNTER — APPOINTMENT (OUTPATIENT)
Facility: CLINIC | Age: 30
End: 2025-01-20
Payer: COMMERCIAL

## 2025-01-20 ENCOUNTER — LAB (OUTPATIENT)
Dept: LAB | Facility: LAB | Age: 30
End: 2025-01-20
Payer: COMMERCIAL

## 2025-01-20 VITALS — BODY MASS INDEX: 42.19 KG/M2 | DIASTOLIC BLOOD PRESSURE: 83 MMHG | SYSTOLIC BLOOD PRESSURE: 135 MMHG | WEIGHT: 245.8 LBS

## 2025-01-20 DIAGNOSIS — E55.9 VITAMIN D DEFICIENCY: ICD-10-CM

## 2025-01-20 DIAGNOSIS — E66.01 CLASS 3 SEVERE OBESITY DUE TO EXCESS CALORIES WITHOUT SERIOUS COMORBIDITY WITH BODY MASS INDEX (BMI) OF 40.0 TO 44.9 IN ADULT: ICD-10-CM

## 2025-01-20 DIAGNOSIS — E66.813 CLASS 3 SEVERE OBESITY DUE TO EXCESS CALORIES WITHOUT SERIOUS COMORBIDITY WITH BODY MASS INDEX (BMI) OF 40.0 TO 44.9 IN ADULT: ICD-10-CM

## 2025-01-20 DIAGNOSIS — Z3A.10 10 WEEKS GESTATION OF PREGNANCY (HHS-HCC): Primary | ICD-10-CM

## 2025-01-20 DIAGNOSIS — Z3A.10 10 WEEKS GESTATION OF PREGNANCY (HHS-HCC): ICD-10-CM

## 2025-01-20 DIAGNOSIS — R79.89 ELEVATED PLATELET COUNT: ICD-10-CM

## 2025-01-20 DIAGNOSIS — O10.919 HTN IN PREGNANCY, CHRONIC (HHS-HCC): ICD-10-CM

## 2025-01-20 LAB
25(OH)D3 SERPL-MCNC: 62 NG/ML (ref 30–100)
ABO GROUP (TYPE) IN BLOOD: NORMAL
ANTIBODY SCREEN: NORMAL
BASOPHILS # BLD AUTO: 0.04 X10*3/UL (ref 0–0.1)
BASOPHILS NFR BLD AUTO: 0.4 %
EOSINOPHIL # BLD AUTO: 0.19 X10*3/UL (ref 0–0.7)
EOSINOPHIL NFR BLD AUTO: 1.8 %
ERYTHROCYTE [DISTWIDTH] IN BLOOD BY AUTOMATED COUNT: 12.9 % (ref 11.5–14.5)
EST. AVERAGE GLUCOSE BLD GHB EST-MCNC: 100 MG/DL
HBA1C MFR BLD: 5.1 %
HBV SURFACE AG SERPL QL IA: NONREACTIVE
HCT VFR BLD AUTO: 36.5 % (ref 36–46)
HGB BLD-MCNC: 12.3 G/DL (ref 12–16)
HIV 1+2 AB+HIV1 P24 AG SERPL QL IA: NONREACTIVE
IMM GRANULOCYTES # BLD AUTO: 0.04 X10*3/UL (ref 0–0.7)
IMM GRANULOCYTES NFR BLD AUTO: 0.4 % (ref 0–0.9)
LYMPHOCYTES # BLD AUTO: 2.26 X10*3/UL (ref 1.2–4.8)
LYMPHOCYTES NFR BLD AUTO: 21.8 %
MCH RBC QN AUTO: 27.2 PG (ref 26–34)
MCHC RBC AUTO-ENTMCNC: 33.7 G/DL (ref 32–36)
MCV RBC AUTO: 81 FL (ref 80–100)
MONOCYTES # BLD AUTO: 0.66 X10*3/UL (ref 0.1–1)
MONOCYTES NFR BLD AUTO: 6.4 %
NEUTROPHILS # BLD AUTO: 7.18 X10*3/UL (ref 1.2–7.7)
NEUTROPHILS NFR BLD AUTO: 69.2 %
NRBC BLD-RTO: 0 /100 WBCS (ref 0–0)
PLATELET # BLD AUTO: 361 X10*3/UL (ref 150–450)
RBC # BLD AUTO: 4.53 X10*6/UL (ref 4–5.2)
REFLEX ADDED, ANEMIA PANEL: NORMAL
RH FACTOR (ANTIGEN D): NORMAL
WBC # BLD AUTO: 10.4 X10*3/UL (ref 4.4–11.3)

## 2025-01-20 PROCEDURE — 86317 IMMUNOASSAY INFECTIOUS AGENT: CPT

## 2025-01-20 PROCEDURE — 81329 SMN1 GENE DOS/DELETION ALYS: CPT

## 2025-01-20 PROCEDURE — 81220 CFTR GENE COM VARIANTS: CPT

## 2025-01-20 PROCEDURE — 0501F PRENATAL FLOW SHEET: CPT | Performed by: OBSTETRICS & GYNECOLOGY

## 2025-01-20 PROCEDURE — 86850 RBC ANTIBODY SCREEN: CPT

## 2025-01-20 PROCEDURE — 86901 BLOOD TYPING SEROLOGIC RH(D): CPT

## 2025-01-20 PROCEDURE — 86803 HEPATITIS C AB TEST: CPT

## 2025-01-20 PROCEDURE — 87340 HEPATITIS B SURFACE AG IA: CPT

## 2025-01-20 PROCEDURE — 81243 FMR1 GEN ALY DETC ABNL ALLEL: CPT

## 2025-01-20 PROCEDURE — 87389 HIV-1 AG W/HIV-1&-2 AB AG IA: CPT

## 2025-01-20 PROCEDURE — 86780 TREPONEMA PALLIDUM: CPT

## 2025-01-20 PROCEDURE — 83036 HEMOGLOBIN GLYCOSYLATED A1C: CPT

## 2025-01-20 PROCEDURE — 85025 COMPLETE CBC W/AUTO DIFF WBC: CPT

## 2025-01-20 PROCEDURE — 82306 VITAMIN D 25 HYDROXY: CPT

## 2025-01-20 PROCEDURE — 86900 BLOOD TYPING SEROLOGIC ABO: CPT

## 2025-01-20 NOTE — PROGRESS NOTES
Doing well. No complaints.     Medical Problems       Problem List       Essential hypertension    Overview Addendum 1/20/2025  9:47 AM by Gaby Thayer MD     CHTN on Labetalol 200 BID  To check home BPs weekly goal is 140-150/  Pt aware of incr risk Preeclampsia / had it last preg at 39 weeks  PLAN:    Baseline HELLP labs and TP= wnl   32w NST or BPP weekly , deliver at 37 w   Growth scans at 28w, 32w,  36 w    Home BPs 136/90  highest         Class 3 severe obesity with body mass index (BMI) of 40.0 to 44.9 in adult    Overview Signed 12/23/2024 11:11 AM by Gaby Thayer MD     Healthy diet discussed - wt gain goal 20 lbs max          Gastroesophageal reflux disease    Overview Signed 12/23/2024 10:53 AM by Gaby Thayer MD     On Protonix - safe in preg         Exercise-induced asthma ([facility])    Overview Signed 12/23/2024 11:11 AM by Gaby Thayer MD     Has albuterol inhaler prn          Elevated platelet count  Recheck CBC    10 weeks gestation of pregnancy ([facility])    Overview Signed 12/23/2024 11:14 AM by Gaby Thayer MD     Desired provider in labor: [] CNM  [x] Physician  [x] Blood Products: [x] Yes, accepts [] No, needs counseling  [x] Initial BMI: 41.86   [] Prenatal Labs: ordered today  [x] Cervical Cancer Screening up to date wnl 8/2024  [] Rh status:   [] Genetic Screening:  agrees  [] NT US: (11-13 wks) to be scheduled   [] Baby ASA (if indicated):  [] Pregnancy dated by: LMP

## 2025-01-21 LAB
HCV AB SER QL: NONREACTIVE
RUBV IGG SERPL IA-ACNC: 0.4 IA
RUBV IGG SERPL QL IA: NEGATIVE
TREPONEMA PALLIDUM IGG+IGM AB [PRESENCE] IN SERUM OR PLASMA BY IMMUNOASSAY: NONREACTIVE

## 2025-01-23 PROBLEM — O09.899 RUBELLA NON-IMMUNE STATUS, ANTEPARTUM (HHS-HCC): Status: ACTIVE | Noted: 2025-01-23

## 2025-01-23 PROBLEM — Z28.39 RUBELLA NON-IMMUNE STATUS, ANTEPARTUM (HHS-HCC): Status: ACTIVE | Noted: 2025-01-23

## 2025-01-23 LAB
ELECTRONICALLY SIGNED BY: NORMAL
SMA RESULT: NORMAL

## 2025-01-28 LAB
CFTR MUT ANL BLD/T: NORMAL
ELECTRONICALLY SIGNED BY: NORMAL

## 2025-01-29 LAB
ELECTRONICALLY SIGNED BY: NORMAL
FRAGILE X INTERPRETATION: NORMAL
FRAGILE X RESULT: NORMAL

## 2025-01-31 LAB
COMMENTS - MP RESULT TYPE: NORMAL
SCAN RESULT: NORMAL

## 2025-02-06 ENCOUNTER — HOSPITAL ENCOUNTER (OUTPATIENT)
Dept: RADIOLOGY | Facility: CLINIC | Age: 30
Discharge: HOME | End: 2025-02-06
Payer: COMMERCIAL

## 2025-02-06 DIAGNOSIS — O99.211 OBESITY AFFECTING PREGNANCY IN FIRST TRIMESTER (HHS-HCC): ICD-10-CM

## 2025-02-06 DIAGNOSIS — Z34.91 PRENATAL CARE IN FIRST TRIMESTER (HHS-HCC): ICD-10-CM

## 2025-02-06 PROCEDURE — 76801 OB US < 14 WKS SINGLE FETUS: CPT

## 2025-02-10 DIAGNOSIS — K21.9 GASTROESOPHAGEAL REFLUX DISEASE, UNSPECIFIED WHETHER ESOPHAGITIS PRESENT: ICD-10-CM

## 2025-02-10 RX ORDER — PANTOPRAZOLE SODIUM 20 MG/1
TABLET, DELAYED RELEASE ORAL
Qty: 90 TABLET | Refills: 3 | Status: SHIPPED | OUTPATIENT
Start: 2025-02-10

## 2025-02-20 ENCOUNTER — APPOINTMENT (OUTPATIENT)
Facility: CLINIC | Age: 30
End: 2025-02-20
Payer: COMMERCIAL

## 2025-02-20 VITALS — WEIGHT: 244.4 LBS | DIASTOLIC BLOOD PRESSURE: 82 MMHG | SYSTOLIC BLOOD PRESSURE: 130 MMHG | BODY MASS INDEX: 41.95 KG/M2

## 2025-02-20 DIAGNOSIS — Z28.39 RUBELLA NON-IMMUNE STATUS, ANTEPARTUM (HHS-HCC): ICD-10-CM

## 2025-02-20 DIAGNOSIS — O10.919 HTN IN PREGNANCY, CHRONIC (HHS-HCC): ICD-10-CM

## 2025-02-20 DIAGNOSIS — Z3A.15 15 WEEKS GESTATION OF PREGNANCY (HHS-HCC): ICD-10-CM

## 2025-02-20 DIAGNOSIS — O09.899 RUBELLA NON-IMMUNE STATUS, ANTEPARTUM (HHS-HCC): ICD-10-CM

## 2025-02-20 PROBLEM — I10 ESSENTIAL HYPERTENSION: Status: RESOLVED | Noted: 2023-02-19 | Resolved: 2025-02-20

## 2025-02-20 PROCEDURE — 0501F PRENATAL FLOW SHEET: CPT | Performed by: OBSTETRICS & GYNECOLOGY

## 2025-02-20 NOTE — PROGRESS NOTES
Doing well. No complaints.   Wt in preg is stable / heathy   Cynthia scan sched   NT scan and OB labs and Genetics wnl

## 2025-02-25 ENCOUNTER — APPOINTMENT (OUTPATIENT)
Dept: PRIMARY CARE | Facility: CLINIC | Age: 30
End: 2025-02-25
Payer: COMMERCIAL

## 2025-03-13 ENCOUNTER — APPOINTMENT (OUTPATIENT)
Facility: CLINIC | Age: 30
End: 2025-03-13
Payer: COMMERCIAL

## 2025-03-13 VITALS — SYSTOLIC BLOOD PRESSURE: 128 MMHG | BODY MASS INDEX: 42.81 KG/M2 | DIASTOLIC BLOOD PRESSURE: 78 MMHG | WEIGHT: 249.4 LBS

## 2025-03-13 DIAGNOSIS — E66.813 CLASS 3 SEVERE OBESITY DUE TO EXCESS CALORIES WITHOUT SERIOUS COMORBIDITY WITH BODY MASS INDEX (BMI) OF 40.0 TO 44.9 IN ADULT: ICD-10-CM

## 2025-03-13 DIAGNOSIS — J45.990 EXERCISE-INDUCED ASTHMA (HHS-HCC): ICD-10-CM

## 2025-03-13 DIAGNOSIS — O10.919 HTN IN PREGNANCY, CHRONIC (HHS-HCC): ICD-10-CM

## 2025-03-13 DIAGNOSIS — E66.01 CLASS 3 SEVERE OBESITY DUE TO EXCESS CALORIES WITHOUT SERIOUS COMORBIDITY WITH BODY MASS INDEX (BMI) OF 40.0 TO 44.9 IN ADULT: ICD-10-CM

## 2025-03-13 DIAGNOSIS — Z3A.18 18 WEEKS GESTATION OF PREGNANCY (HHS-HCC): Primary | ICD-10-CM

## 2025-03-13 PROBLEM — R79.89 ELEVATED PLATELET COUNT: Status: RESOLVED | Noted: 2024-12-05 | Resolved: 2025-03-13

## 2025-03-13 PROCEDURE — 0501F PRENATAL FLOW SHEET: CPT | Performed by: OBSTETRICS & GYNECOLOGY

## 2025-03-13 NOTE — PROGRESS NOTES
Doing well. No complaints.   Started exercising again , uses asthma inhaler prn . Wt gain maintained top 5 lbs so far.  Has ya scan scheduled.      Medical Problems       Problem List       Class 3 severe obesity with body mass index (BMI) of 40.0 to 44.9 in adult    Overview Signed 12/23/2024 11:11 AM by Gaby Thayer MD     Healthy diet discussed - wt gain goal 20 lbs max          Gastroesophageal reflux disease    Overview Signed 12/23/2024 10:53 AM by Gaby Thayer MD     On Protonix - safe in preg         Exercise-induced asthma ([facility])    Overview Signed 12/23/2024 11:11 AM by Gaby Thayer MD     Has albuterol inhaler prn          18 weeks gestation of pregnancy ([facility])    Overview Addendum 2/20/2025 10:50 AM by Gaby Thayer MD     Desired provider in labor: [] CNM  [x] Physician  [x] Blood Products: [x] Yes, accepts [] No, needs counseling  [x] Initial BMI: 41.86   [x] Prenatal Labs:   [x] Cervical Cancer Screening up to date wnl 8/2024  [x] Rh status: A pos   [x] Genetic Screening:  XX cfDNA wnl, carrier screen neg   [x] NT US: (11-13 wks)  [] Baby ASA (if indicated):  [] Pregnancy dated by: LMP           HTN in pregnancy, chronic ([facility])    Overview Addendum 3/13/2025 10:34 AM by Gaby Thayer MD     CHTN on Labetalol 100 QD  To check home BPs weekly goal is 140-150/  Pt aware of incr risk Preeclampsia / had it last preg at 39 weeks  PLAN:  32w NST or BPP weekly , deliver at 37 w   Growth scans at 28w, 32w,  36 w    Home BPs 125/ 80 highest         Rubella non-immune status, antepartum ([facility])    Overview Signed 1/23/2025  2:11 PM by Gaby Thayer MD     Vaccine postpartum

## 2025-03-17 ENCOUNTER — APPOINTMENT (OUTPATIENT)
Facility: CLINIC | Age: 30
End: 2025-03-17
Payer: COMMERCIAL

## 2025-03-27 ENCOUNTER — HOSPITAL ENCOUNTER (OUTPATIENT)
Dept: RADIOLOGY | Facility: CLINIC | Age: 30
Discharge: HOME | End: 2025-03-27
Payer: COMMERCIAL

## 2025-03-27 DIAGNOSIS — O10.013 PRE-EXISTING ESSENTIAL HYPERTENSION COMPLICATING PREGNANCY, THIRD TRIMESTER: ICD-10-CM

## 2025-03-27 DIAGNOSIS — O09.899 SINGLE UMBILICAL ARTERY AFFECTING MANAGEMENT OF MOTHER IN SINGLETON PREGNANCY, ANTEPARTUM (HHS-HCC): ICD-10-CM

## 2025-03-27 DIAGNOSIS — O99.212 OBESITY COMPLICATING PREGNANCY, SECOND TRIMESTER (HHS-HCC): ICD-10-CM

## 2025-03-27 DIAGNOSIS — O35.9XX0 MATERNAL CARE FOR (SUSPECTED) FETAL ABNORMALITY AND DAMAGE, UNSPECIFIED, NOT APPLICABLE OR UNSPECIFIED: ICD-10-CM

## 2025-03-27 DIAGNOSIS — Z34.91 PRENATAL CARE IN FIRST TRIMESTER: ICD-10-CM

## 2025-03-27 PROCEDURE — 76811 OB US DETAILED SNGL FETUS: CPT

## 2025-04-03 PROBLEM — Q27.0 SINGLE UMBILICAL ARTERY (HHS-HCC): Status: ACTIVE | Noted: 2025-04-03

## 2025-04-03 PROBLEM — Z3A.21 21 WEEKS GESTATION OF PREGNANCY (HHS-HCC): Status: ACTIVE | Noted: 2024-12-23

## 2025-04-07 ENCOUNTER — APPOINTMENT (OUTPATIENT)
Facility: CLINIC | Age: 30
End: 2025-04-07
Payer: COMMERCIAL

## 2025-04-07 VITALS — DIASTOLIC BLOOD PRESSURE: 76 MMHG | WEIGHT: 252.6 LBS | SYSTOLIC BLOOD PRESSURE: 125 MMHG | BODY MASS INDEX: 43.36 KG/M2

## 2025-04-07 DIAGNOSIS — I10 HYPERTENSION, UNSPECIFIED TYPE: ICD-10-CM

## 2025-04-07 DIAGNOSIS — Q27.0 SINGLE UMBILICAL ARTERY (HHS-HCC): ICD-10-CM

## 2025-04-07 DIAGNOSIS — Z3A.21 21 WEEKS GESTATION OF PREGNANCY (HHS-HCC): ICD-10-CM

## 2025-04-07 DIAGNOSIS — O10.919 HTN IN PREGNANCY, CHRONIC (HHS-HCC): Primary | ICD-10-CM

## 2025-04-07 PROCEDURE — 0501F PRENATAL FLOW SHEET: CPT | Performed by: OBSTETRICS & GYNECOLOGY

## 2025-04-07 RX ORDER — LABETALOL 100 MG/1
TABLET, FILM COATED ORAL
Qty: 60 TABLET | Refills: 2 | Status: SHIPPED | OUTPATIENT
Start: 2025-04-07

## 2025-04-07 RX ORDER — NAPROXEN SODIUM 220 MG/1
162 TABLET, FILM COATED ORAL DAILY
Qty: 60 TABLET | Refills: 11 | Status: SHIPPED | OUTPATIENT
Start: 2025-04-07 | End: 2026-04-07

## 2025-04-07 NOTE — PROGRESS NOTES
Medical Problems       Problem List       HTN in pregnancy, chronic ([facility])    Overview Addendum 4/7/2025 10:30 AM by Gaby Thayer MD     CHTN on Labetalol 100 BID   To check home BPs weekly goal is 140 /80s  Pt aware of incr risk Preeclampsia / had it last preg at 39 weeks  PLAN:  32w NST or BPP weekly , deliver at 37 w   Growth scans at 28w, 32w,  36 w    Home BPs 144/ 77  highest         Class 3 severe obesity with body mass index (BMI) of 40.0 to 44.9 in adult    Overview Signed 12/23/2024 11:11 AM by Gaby Thayer MD     Healthy diet discussed - wt gain goal 20 lbs max          Gastroesophageal reflux disease    Overview Signed 12/23/2024 10:53 AM by Gaby Thayer MD     On Protonix - safe in preg         Exercise-induced asthma    Overview Signed 12/23/2024 11:11 AM by Gaby Thayer MD     Has albuterol inhaler prn          21 weeks gestation of pregnancy ([facility])    Overview Addendum 2/20/2025 10:50 AM by Gaby Thayer MD     Desired provider in labor: [] CNM  [x] Physician  [x] Blood Products: [x] Yes, accepts [] No, needs counseling  [x] Initial BMI: 41.86   [x] Prenatal Labs:   [x] Cervical Cancer Screening up to date wnl 8/2024  [x] Rh status: A pos   [x] Genetic Screening:  XX cfDNA wnl, carrier screen neg   [x] NT US: (11-13 wks)  [] Baby ASA (if indicated):  [] Pregnancy dated by: LMP         Rubella non-immune status, antepartum ([facility])    Overview Signed 1/23/2025  2:11 PM by Gaby Thayer MD     Vaccine postpartum          Single umbilical artery ([facility])    Overview Signed 4/5/2025  9:05 PM by Gaby Thayer MD     Seen at 20 weeks   Follow up growth scans

## 2025-04-09 ENCOUNTER — APPOINTMENT (OUTPATIENT)
Dept: RADIOLOGY | Facility: CLINIC | Age: 30
End: 2025-04-09
Payer: COMMERCIAL

## 2025-04-10 ENCOUNTER — HOSPITAL ENCOUNTER (OUTPATIENT)
Dept: RADIOLOGY | Facility: HOSPITAL | Age: 30
Discharge: HOME | End: 2025-04-10
Payer: COMMERCIAL

## 2025-04-10 DIAGNOSIS — Z34.91 PRENATAL CARE IN FIRST TRIMESTER: ICD-10-CM

## 2025-04-10 PROCEDURE — 76816 OB US FOLLOW-UP PER FETUS: CPT

## 2025-05-04 PROBLEM — Z3A.25 25 WEEKS GESTATION OF PREGNANCY (HHS-HCC): Status: ACTIVE | Noted: 2024-12-23

## 2025-05-05 ENCOUNTER — APPOINTMENT (OUTPATIENT)
Facility: CLINIC | Age: 30
End: 2025-05-05
Payer: COMMERCIAL

## 2025-05-05 VITALS — DIASTOLIC BLOOD PRESSURE: 76 MMHG | BODY MASS INDEX: 43.98 KG/M2 | SYSTOLIC BLOOD PRESSURE: 121 MMHG | WEIGHT: 256.2 LBS

## 2025-05-05 DIAGNOSIS — E66.813 CLASS 3 SEVERE OBESITY WITH BODY MASS INDEX (BMI) OF 40.0 TO 44.9 IN ADULT: ICD-10-CM

## 2025-05-05 DIAGNOSIS — Q27.0 SINGLE UMBILICAL ARTERY (HHS-HCC): ICD-10-CM

## 2025-05-05 DIAGNOSIS — K21.9 GASTROESOPHAGEAL REFLUX DISEASE WITHOUT ESOPHAGITIS: ICD-10-CM

## 2025-05-05 DIAGNOSIS — J45.990 EXERCISE-INDUCED ASTHMA: ICD-10-CM

## 2025-05-05 DIAGNOSIS — Z34.80 PRENATAL CARE, SUBSEQUENT PREGNANCY, ANTEPARTUM: Primary | ICD-10-CM

## 2025-05-05 DIAGNOSIS — O10.919 HTN IN PREGNANCY, CHRONIC (HHS-HCC): ICD-10-CM

## 2025-05-05 PROCEDURE — 0501F PRENATAL FLOW SHEET: CPT | Performed by: OBSTETRICS & GYNECOLOGY

## 2025-05-05 RX ORDER — ALBUTEROL SULFATE 90 UG/1
2 INHALANT RESPIRATORY (INHALATION) EVERY 6 HOURS PRN
COMMUNITY

## 2025-05-05 NOTE — PROGRESS NOTES
Doing well. No complaints.   Medical Problems       Problem List       HTN in pregnancy, chronic ([facility])    Overview Addendum 5/5/2025 10:29 AM by Gaby Thayer MD   CHTN on Labetalol 100 BID   To check home BPs weekly goal is 140 /80s  Pt aware of incr risk Preeclampsia / had it last preg at 39 weeks  PLAN:  32w NST or BPP weekly , deliver at 37 w   Growth scans at 28w, 32w,  36 w    Home BPs 136/ 76  highest         Class 3 severe obesity with body mass index (BMI) of 40.0 to 44.9 in adult    Overview Addendum 4/25/2025  6:10 PM by Gaby Thayer MD   Healthy diet discussed - wt gain goal 20 lbs max   Follow up growth scans and Fetal ECHO          Gastroesophageal reflux disease    Overview Signed 12/23/2024 10:53 AM by Gaby Thayer MD   On Protonix - safe in preg         Exercise-induced asthma    Overview Signed 12/23/2024 11:11 AM by Gaby Thayer MD   Has albuterol inhaler prn          25 weeks gestation of pregnancy ([facility])    Overview Addendum 5/4/2025  1:42 PM by Gaby Thayer MD   Desired provider in labor: [] CNM  [x] Physician  [x] Blood Products: [x] Yes, accepts [] No, needs counseling  [x] Initial BMI: 41.86   [x] Prenatal Labs:   [x] Cervical Cancer Screening up to date wnl 8/2024  [x] Rh status: A pos   [x] Genetic Screening:  XX cfDNA wnl, carrier screen neg   [x] NT US: (11-13 wks)  [] Baby ASA (if indicated):  [x] Pregnancy dated by: LMP    [x] Anatomy US: (19-20 wks)  [] Federal Sterilization consent signed (if indicated):  [] 1hr GCT at 24-28wks:  [x] Rhogam (if indicated):   No  A pos   [] Fetal Surveillance (if indicated):  [] Tdap (27-32 wks, may be given up to 36 wks if initial window missed): next visit    [] RSV (32-36 wks) (Sept. to end of Jan):   [] Flu Vaccine:    [x] Breastfeeding: yes   [] Postpartum Birth control method:   [] GBS at 36 - 37 wks:  [] 39 weeks discussion of IOL vs. Expectant management:  [] Mode of delivery ( anticipated ):           Rubella non-immune status, antepartum ([facility])    Overview Signed 1/23/2025  2:11 PM by Gaby Thayer MD   Vaccine postpartum          Single umbilical artery ([facility])    Overview Signed 4/5/2025  9:05 PM by Gaby Thayer MD   Seen at 20 weeks   Follow up growth scans   Fetal ECHO scheduled

## 2025-05-16 ENCOUNTER — LAB (OUTPATIENT)
Dept: LAB | Facility: HOSPITAL | Age: 30
End: 2025-05-16
Payer: COMMERCIAL

## 2025-05-16 LAB
ERYTHROCYTE [DISTWIDTH] IN BLOOD BY AUTOMATED COUNT: 13.3 % (ref 11.5–14.5)
HCT VFR BLD AUTO: 31.3 % (ref 36–46)
HGB BLD-MCNC: 10.2 G/DL (ref 12–16)
MCH RBC QN AUTO: 27.4 PG (ref 26–34)
MCHC RBC AUTO-ENTMCNC: 32.6 G/DL (ref 32–36)
MCV RBC AUTO: 84 FL (ref 80–100)
NRBC BLD-RTO: 0 /100 WBCS (ref 0–0)
PLATELET # BLD AUTO: 291 X10*3/UL (ref 150–450)
RBC # BLD AUTO: 3.72 X10*6/UL (ref 4–5.2)
WBC # BLD AUTO: 12.3 X10*3/UL (ref 4.4–11.3)

## 2025-05-16 PROCEDURE — 82728 ASSAY OF FERRITIN: CPT

## 2025-05-16 PROCEDURE — 82746 ASSAY OF FOLIC ACID SERUM: CPT

## 2025-05-16 PROCEDURE — 83550 IRON BINDING TEST: CPT

## 2025-05-16 PROCEDURE — 82607 VITAMIN B-12: CPT

## 2025-05-16 PROCEDURE — 85027 COMPLETE CBC AUTOMATED: CPT

## 2025-05-17 LAB
FERRITIN SERPL-MCNC: 12 NG/ML
FOLATE SERPL-MCNC: 12.6 NG/ML
GLUCOSE 1H P 50 G GLC PO SERPL-MCNC: 133 MG/DL
IRON SATN MFR SERPL: 19 %
IRON SERPL-MCNC: 76 UG/DL
REFLEX ADDED, ANEMIA PANEL: NORMAL
TIBC SERPL-MCNC: 404 UG/DL
UIBC SERPL-MCNC: 328 UG/DL
VIT B12 SERPL-MCNC: 280 PG/ML

## 2025-05-19 ENCOUNTER — APPOINTMENT (OUTPATIENT)
Facility: CLINIC | Age: 30
End: 2025-05-19
Payer: COMMERCIAL

## 2025-05-19 VITALS — SYSTOLIC BLOOD PRESSURE: 117 MMHG | WEIGHT: 260 LBS | BODY MASS INDEX: 44.63 KG/M2 | DIASTOLIC BLOOD PRESSURE: 77 MMHG

## 2025-05-19 DIAGNOSIS — O99.013 ANEMIA DURING PREGNANCY IN THIRD TRIMESTER: Primary | ICD-10-CM

## 2025-05-19 DIAGNOSIS — E66.813 CLASS 3 SEVERE OBESITY WITH BODY MASS INDEX (BMI) OF 40.0 TO 44.9 IN ADULT: ICD-10-CM

## 2025-05-19 DIAGNOSIS — Z3A.28 28 WEEKS GESTATION OF PREGNANCY (HHS-HCC): ICD-10-CM

## 2025-05-19 DIAGNOSIS — O10.919 HTN IN PREGNANCY, CHRONIC (HHS-HCC): ICD-10-CM

## 2025-05-19 DIAGNOSIS — O99.013 ANEMIA DURING PREGNANCY IN THIRD TRIMESTER: ICD-10-CM

## 2025-05-19 DIAGNOSIS — Q27.0 SINGLE UMBILICAL ARTERY (HHS-HCC): ICD-10-CM

## 2025-05-19 PROBLEM — O99.019 ANTEPARTUM ANEMIA: Status: ACTIVE | Noted: 2025-05-19

## 2025-05-19 PROCEDURE — 90715 TDAP VACCINE 7 YRS/> IM: CPT | Performed by: OBSTETRICS & GYNECOLOGY

## 2025-05-19 PROCEDURE — 0501F PRENATAL FLOW SHEET: CPT | Performed by: OBSTETRICS & GYNECOLOGY

## 2025-05-19 PROCEDURE — 90471 IMMUNIZATION ADMIN: CPT | Performed by: OBSTETRICS & GYNECOLOGY

## 2025-05-19 RX ORDER — FERROUS SULFATE 325(65) MG
65 TABLET ORAL ONCE
Qty: 30 TABLET | Refills: 3 | Status: SHIPPED | OUTPATIENT
Start: 2025-05-19 | End: 2025-05-19

## 2025-05-19 RX ORDER — FERROUS SULFATE 325(65) MG
65 TABLET ORAL DAILY
Qty: 30 TABLET | Refills: 3 | Status: SHIPPED | OUTPATIENT
Start: 2025-05-19

## 2025-05-19 ASSESSMENT — EDINBURGH POSTNATAL DEPRESSION SCALE (EPDS)
I HAVE BEEN ANXIOUS OR WORRIED FOR NO GOOD REASON: YES, SOMETIMES
I HAVE BEEN SO UNHAPPY THAT I HAVE HAD DIFFICULTY SLEEPING: NOT AT ALL
THE THOUGHT OF HARMING MYSELF HAS OCCURRED TO ME: NEVER
I HAVE LOOKED FORWARD WITH ENJOYMENT TO THINGS: RATHER LESS THAN I USED TO
I HAVE BLAMED MYSELF UNNECESSARILY WHEN THINGS WENT WRONG: NO, NEVER
TOTAL SCORE: 9
THINGS HAVE BEEN GETTING ON TOP OF ME: YES, SOMETIMES I HAVEN'T BEEN COPING AS WELL AS USUAL
I HAVE BEEN ABLE TO LAUGH AND SEE THE FUNNY SIDE OF THINGS: AS MUCH AS I ALWAYS COULD
I HAVE FELT SAD OR MISERABLE: NOT VERY OFTEN
I HAVE BEEN SO UNHAPPY THAT I HAVE BEEN CRYING: ONLY OCCASIONALLY
I HAVE FELT SCARED OR PANICKY FOR NO GOOD REASON: YES, SOMETIMES

## 2025-05-19 NOTE — PROGRESS NOTES
TDAP today. Patient has been waking up with headaches but they go away after taking baby aspirin with water.        Medical Problems       Problem List       HTN in pregnancy, chronic ([facility])    Overview Addendum 5/5/2025 10:29 AM by Gaby Thayer MD   CHTN on Labetalol 100 BID   To check home BPs weekly goal is 140 /80s  Pt aware of incr risk Preeclampsia / had it last preg at 39 weeks  PLAN:  32w NST or BPP weekly , deliver at 37 w   Growth scans at 28w, 32w,  36 w    Home BPs wnlo         Class 3 severe obesity with body mass index (BMI) of 40.0 to 44.9 in adult    Overview Addendum 4/25/2025  6:10 PM by Gaby Thayer MD   Healthy diet discussed - wt gain goal 20 lbs max   Follow up growth scans and Fetal ECHO          Gastroesophageal reflux disease    Overview Signed 12/23/2024 10:53 AM by Gaby Thayer MD   On Protonix - safe in preg         Exercise-induced asthma    Overview Signed 12/23/2024 11:11 AM by Gaby Thayer MD   Has albuterol inhaler prn          25 weeks gestation of pregnancy ([facility])    Overview Addendum 5/4/2025  1:42 PM by Gaby Thayer MD   Desired provider in labor: [] CNM  [x] Physician  [x] Blood Products: [x] Yes, accepts [] No, needs counseling  [x] Initial BMI: 41.86   [x] Prenatal Labs:   [x] Cervical Cancer Screening up to date wnl 8/2024  [x] Rh status: A pos   [x] Genetic Screening:  XX cfDNA wnl, carrier screen neg   [x] NT US: (11-13 wks)  [] Baby ASA (if indicated):  [x] Pregnancy dated by: LMP    [x] Anatomy US: (19-20 wks)  [] Federal Sterilization consent signed (if indicated):  [x] 1hr GCT at 24-28wks: wnl   [x] Rhogam (if indicated):   No  A pos   [] Fetal Surveillance (if indicated):  [] Tdap (27-32 wks, may be given up to 36 wks if initial window missed):   [] RSV (32-36 wks) (Sept. to end of [place]):   [] Flu Vaccine:    [] Breastfeeding:  [] Postpartum Birth control method:   [] GBS at 36 - 37 wks:  [] 39 weeks discussion of IOL vs.  Expectant management:  [] Mode of delivery ( anticipated ):          Rubella non-immune status, antepartum ([facility])    Overview Signed 1/23/2025  2:11 PM by Gaby Thayer MD   Vaccine postpartum          Single umbilical artery ([facility])    Overview Signed 4/5/2025  9:05 PM by Gaby Thayer MD   Seen at 20 weeks   Follow up growth scans          Antepartum anemia    Overview Signed 5/19/2025  9:43 AM by Gaby Thayer MD   Added iron RX daily

## 2025-05-22 ENCOUNTER — HOSPITAL ENCOUNTER (OUTPATIENT)
Dept: RADIOLOGY | Facility: CLINIC | Age: 30
Discharge: HOME | End: 2025-05-22
Payer: COMMERCIAL

## 2025-05-22 DIAGNOSIS — O99.013 ANEMIA COMPLICATING PREGNANCY, THIRD TRIMESTER: ICD-10-CM

## 2025-05-22 DIAGNOSIS — O09.899 SINGLE UMBILICAL ARTERY AFFECTING MANAGEMENT OF MOTHER IN SINGLETON PREGNANCY, ANTEPARTUM (HHS-HCC): ICD-10-CM

## 2025-05-22 DIAGNOSIS — O10.013 PRE-EXISTING ESSENTIAL HYPERTENSION COMPLICATING PREGNANCY, THIRD TRIMESTER: ICD-10-CM

## 2025-05-22 DIAGNOSIS — O99.213 OBESITY COMPLICATING PREGNANCY, THIRD TRIMESTER (HHS-HCC): ICD-10-CM

## 2025-05-22 DIAGNOSIS — Z34.91 PRENATAL CARE IN FIRST TRIMESTER: ICD-10-CM

## 2025-05-22 PROCEDURE — 76819 FETAL BIOPHYS PROFIL W/O NST: CPT

## 2025-05-22 PROCEDURE — 76816 OB US FOLLOW-UP PER FETUS: CPT

## 2025-05-27 DIAGNOSIS — I10 HYPERTENSION, UNSPECIFIED TYPE: ICD-10-CM

## 2025-05-27 DIAGNOSIS — O10.919 HTN IN PREGNANCY, CHRONIC (HHS-HCC): Primary | ICD-10-CM

## 2025-05-28 ENCOUNTER — APPOINTMENT (OUTPATIENT)
Dept: PEDIATRIC CARDIOLOGY | Facility: CLINIC | Age: 30
End: 2025-05-28
Payer: COMMERCIAL

## 2025-05-28 VITALS
TEMPERATURE: 98.6 F | HEIGHT: 63 IN | RESPIRATION RATE: 18 BRPM | HEART RATE: 86 BPM | WEIGHT: 258.6 LBS | SYSTOLIC BLOOD PRESSURE: 140 MMHG | DIASTOLIC BLOOD PRESSURE: 81 MMHG | BODY MASS INDEX: 45.82 KG/M2 | OXYGEN SATURATION: 97 %

## 2025-05-28 DIAGNOSIS — O35.BXX0 ABNORMAL FETAL ECHOCARDIOGRAPHY AFFECTING ANTEPARTUM CARE OF MOTHER, SINGLE OR UNSPECIFIED FETUS (HHS-HCC): Primary | ICD-10-CM

## 2025-05-28 DIAGNOSIS — O35.8XX0 MATERNAL CARE FOR OTHER (SUSPECTED) FETAL ABNORMALITY AND DAMAGE, NOT APPLICABLE OR UNSPECIFIED (HHS-HCC): ICD-10-CM

## 2025-05-28 DIAGNOSIS — O28.3 ABNORMAL FETAL ULTRASOUND: ICD-10-CM

## 2025-05-28 DIAGNOSIS — Q27.0 SINGLE UMBILICAL ARTERY (HHS-HCC): ICD-10-CM

## 2025-05-28 PROCEDURE — 76825 ECHO EXAM OF FETAL HEART: CPT | Performed by: PEDIATRICS

## 2025-05-28 PROCEDURE — 3079F DIAST BP 80-89 MM HG: CPT | Performed by: PEDIATRICS

## 2025-05-28 PROCEDURE — 93325 DOPPLER ECHO COLOR FLOW MAPG: CPT | Performed by: PEDIATRICS

## 2025-05-28 PROCEDURE — 99204 OFFICE O/P NEW MOD 45 MIN: CPT | Performed by: PEDIATRICS

## 2025-05-28 PROCEDURE — 76827 ECHO EXAM OF FETAL HEART: CPT | Performed by: PEDIATRICS

## 2025-05-28 PROCEDURE — 3008F BODY MASS INDEX DOCD: CPT | Performed by: PEDIATRICS

## 2025-05-28 PROCEDURE — 3077F SYST BP >= 140 MM HG: CPT | Performed by: PEDIATRICS

## 2025-05-28 NOTE — LETTER
May 28, 2025     Carolina Lutz MD  [address]  [place], [facility] [address]  [facility] [address]    Patient: Karine Ruiz   YOB: 1995   Date of Visit: 2025       Dear Dr. Carolina Lutz MD:    Thank you for referring Karine Ruiz to me for evaluation. Below are my notes for this consultation.  If you have questions, please do not hesitate to call me. I look forward to following your patient along with you.       Sincerely,     Óscar Caicedo MD      CC: MD Sofia Jones, APRN-CN, [facility]  Kim Paula, APRN-CNP  ______________________________________________________________________________________    Karine Ruiz was seen at the request of Carolina Lutz for a chief complaint of BMI of 45 and limited views; a report with my findings is being sent via written or electronic means the referring physician with my recommendations for treatment.     I had the pleasure of seeing Karine Ruiz in Pediatric Cardiology consultation at our [place] location as part of our prenatal heart program for BMI of 45 and limited views.  She is a 29 y.o. year-old  woman, currently 29w1d weeks gestation.  Patient's last menstrual period was 2024. Estimated Date of Delivery: 25.  There have been no pregnancy complications.   She has not been hospitalized during this pregnancy.  She had a NIPT, which was normal.  She had a second trimester ultrasound which showed a SUA and limited aortic arch views.      Prior to the visit, I personally reviewed the cardiac portions of the obstetrical ultrasound performed on 25.  There is normal segmental anatomy with normal 4 chamber, outflow tract and 3 vessel views.  There is no evidence of septation defect, right or left ventricular outflow obstruction or significant valvular regurgitation.    Her previous obstetrical history is significant for 1 full term delivery.  Her past medical  "history is significant for obesity, hypertension, and asthma.  She has no history of congenital heart disease, arrhythmia, cardiomyopathy, hypercholesterolemia, diabetes, rheumatic heart disease, cancer, lupus, Sjogren syndrome, clotting disorder, depression, anxiety, alcohol abuse, phenylketonuria, or DiGeorge.  She has had an esophageal scope and dilation.  She takes iron, labetalol, zyrtec, pantoprazole, and Aspirin.  She is allergic to bee pollen, bee venom protein (honey bee), and cat dander. She is currently taking prenatal vitamins.      Her family history is negative for congenital heart disease, early atherosclerosis, sudden cardiac death, long QT syndrome, cardiomyopathy, aortic aneurysm, or genetic or metabolic disease.  FOB states his family history is negative as well.    She currently lives with her spouse and daughter and is .  She works as a .  She does not smoke.  She denies illicit drug use or alcohol abuse.  She denies verbal, sexual, or physical abuse.     Delivery [facility]: [facility]  Father of the baby's name: Eartly    /81 (BP Location: Right arm, Patient Position: Sitting, BP Cuff Size: Large adult)   Pulse 86   Temp 37 °C (98.6 °F)   Resp 18   Ht 1.61 m (5' 3.39\")   Wt 117 kg (258 lb 9.6 oz)   LMP 11/05/2024   SpO2 97%   BMI 45.25 kg/m²     She was resting comfortably in the examination room and alert, active and in no respiratory distress. Skin was without rash.  HEENT: moist mucous membranes, no JVD, goiter. Breathing is not labored.  She was acyanotic.  There was no peripheral edema.   The abdomen was gravid, soft, nontender with normal bowel sounds.  The liver was not palpable.  The spleen tip was not palpable.  She had a normal gait and normal strength in all extremities.  Cranial nerves II - XII are intact.  She had no clubbing, cyanosis, or edema.    A two-dimensional and Doppler fetal echocardiogram was performed today and interpreted by me at 29w1d " weeks gestation.  The fetal echocardiogram showed normal segmental anatomy with no structural abnormalities found.  There is normal cardiac function.  There is no evidence of septation defect, right or left ventricular outflow obstruction or significant valvular regurgitation.  The fetal heart rate was within normal limits without ectopy or arrhythmia seen.  The spectral Doppler pattern across all valves, venous structures, and arterial structures was within normal limits.  There is no pericardial effusion.  Please see full report for details.    In summary, Karine Ruiz is a 29 y.o. year-old  woman, currently 29w1d weeks gestation, who had a normal fetal echocardiogram at today's visit.  Therefore, we did not make any changes to her current delivery plan.  We did not prescribe any medications.  We did not recommend intervention.  She does not necessarily need to follow up with pediatric cardiology after the baby is born unless the pediatric team has any concerns or worries.     LOC: 0  Normal fetal echocardiogram within the limitations of ultrasound. No changes were made to current delivery plan. Triage code 0:  Delivery per OB at patient's preferred hospital.  Standard  care per  team.  Cardiology consult not necessary, unless there are clinical concerns.       Scribe’s statement: IKim CNP, am scribing for, and in the presence of, Dr. Caicedo.    Thank you for allowing me to participate in Birgit care.  If you have any further questions, please do not hesitate to contact me.     Óscar Caicedo M.D.  [facility], Director  Ambulatory Pediatric Cardiology   Division of Pediatric Cardiology  [facility]  [facility]   of Pediatrics, [facility]  [facility] -   [address]  [place]  [address]  Office:  [phone]  Fax:       [phone]  e-mail:  [email or web address]    I spent greater than 45 minutes in performance of this consultation, of which greater than 50% was related to coordination of care or counseling.

## 2025-05-28 NOTE — PROGRESS NOTES
Karine Ruiz was seen at the request of Carolina Lutz for a chief complaint of BMI of 45 and limited views; a report with my findings is being sent via written or electronic means the referring physician with my recommendations for treatment.     I had the pleasure of seeing Karine Ruiz in Pediatric Cardiology consultation at our [place] location as part of our prenatal heart program for BMI of 45 and limited views.  She is a 29 y.o. year-old  woman, currently 29w1d weeks gestation.  Patient's last menstrual period was 2024. Estimated Date of Delivery: 25.  There have been no pregnancy complications.   She has not been hospitalized during this pregnancy.  She had a NIPT, which was normal.  She had a second trimester ultrasound which showed a SUA and limited aortic arch views.      Prior to the visit, I personally reviewed the cardiac portions of the obstetrical ultrasound performed on 25.  There is normal segmental anatomy with normal 4 chamber, outflow tract and 3 vessel views.  There is no evidence of septation defect, right or left ventricular outflow obstruction or significant valvular regurgitation.    Her previous obstetrical history is significant for 1 full term delivery.  Her past medical history is significant for obesity, hypertension, and asthma.  She has no history of congenital heart disease, arrhythmia, cardiomyopathy, hypercholesterolemia, diabetes, rheumatic heart disease, cancer, lupus, Sjogren syndrome, clotting disorder, depression, anxiety, alcohol abuse, phenylketonuria, or DiGeorge.  She has had an esophageal scope and dilation.  She takes iron, labetalol, zyrtec, pantoprazole, and Aspirin.  She is allergic to bee pollen, bee venom protein (honey bee), and cat dander. She is currently taking prenatal vitamins.      Her family history is negative for congenital heart disease, early atherosclerosis, sudden cardiac death, long QT syndrome,  "cardiomyopathy, aortic aneurysm, or genetic or metabolic disease.  FOB states his family history is negative as well.    She currently lives with her spouse and daughter and is .  She works as a .  She does not smoke.  She denies illicit drug use or alcohol abuse.  She denies verbal, sexual, or physical abuse.     Delivery Hospital: [facility]  Father of the baby's name: Eares    /81 (BP Location: Right arm, Patient Position: Sitting, BP Cuff Size: Large adult)   Pulse 86   Temp 37 °C (98.6 °F)   Resp 18   Ht 1.61 m (5' 3.39\")   Wt 117 kg (258 lb 9.6 oz)   LMP 2024   SpO2 97%   BMI 45.25 kg/m²     She was resting comfortably in the examination room and alert, active and in no respiratory distress. Skin was without rash.  HEENT: moist mucous membranes, no JVD, goiter. Breathing is not labored.  She was acyanotic.  There was no peripheral edema.   The abdomen was gravid, soft, nontender with normal bowel sounds.  The liver was not palpable.  The spleen tip was not palpable.  She had a normal gait and normal strength in all extremities.  Cranial nerves II - XII are intact.  She had no clubbing, cyanosis, or edema.    A two-dimensional and Doppler fetal echocardiogram was performed today and interpreted by me at 29w1d weeks gestation.  The fetal echocardiogram showed normal segmental anatomy with no structural abnormalities found.  There is normal cardiac function.  There is no evidence of septation defect, right or left ventricular outflow obstruction or significant valvular regurgitation.  The fetal heart rate was within normal limits without ectopy or arrhythmia seen.  The spectral Doppler pattern across all valves, venous structures, and arterial structures was within normal limits.  There is no pericardial effusion.  Please see full report for details.    In summary, Karine Ruiz is a 29 y.o. year-old  woman, currently 29w1d weeks gestation, who had a normal fetal " echocardiogram at today's visit.  Therefore, we did not make any changes to her current delivery plan.  We did not prescribe any medications.  We did not recommend intervention.  She does not necessarily need to follow up with pediatric cardiology after the baby is born unless the pediatric team has any concerns or worries.     LOC: 0  Normal fetal echocardiogram within the limitations of ultrasound. No changes were made to current delivery plan. Triage code 0:  Delivery per OB at patient's preferred hospital.  Standard  care per  team.  Cardiology consult not necessary, unless there are clinical concerns.       Scribe’s statement: I, Kim Paula CNP, am scribing for, and in the presence of, Dr. Caicedo.    Thank you for allowing me to participate in Karine's care.  If you have any further questions, please do not hesitate to contact me.     Óscar Caicedo M.D.  [facility]etal [facility] Center, Director  Ambulatory Pediatric Cardiology   Division of Pediatric Cardiology  [facility]  [facility]   of Pediatrics, [facility]  [facility] [address]  [address]  [place] [id number]  Office:  [phone]  Fax:       [phone]  e-mail:  [email or web address]    I spent greater than 45 minutes in performance of this consultation, of which greater than 50% was related to coordination of care or counseling.

## 2025-06-02 RX ORDER — LABETALOL 100 MG/1
100 TABLET, FILM COATED ORAL 2 TIMES DAILY
Qty: 180 TABLET | Refills: 1 | Status: SHIPPED | OUTPATIENT
Start: 2025-06-02 | End: 2025-08-31

## 2025-06-05 ENCOUNTER — APPOINTMENT (OUTPATIENT)
Facility: CLINIC | Age: 30
End: 2025-06-05
Payer: COMMERCIAL

## 2025-06-06 PROBLEM — Z3A.30 30 WEEKS GESTATION OF PREGNANCY (HHS-HCC): Status: ACTIVE | Noted: 2024-12-23

## 2025-06-06 NOTE — PROGRESS NOTES
Ob Visit  25     SUBJECTIVE      HPI: Karine Ruiz is a 29 y.o.  at 30w3d here for RPNV.  She has ***contractions, ***bleeding, or ***LOF. Reports ***normal fetal movement. Patient reports ***       OBJECTIVE  Visit Vitals  LMP 2024   OB Status Pregnant   Smoking Status Never            ASSESSMENT & PLAN    Karine Ruiz is a 29 y.o.  at 30w3d here for the following concerns we addressed today:    Problem List Items Addressed This Visit    None        RTC in *** weeks      Deepti Diaz MD     Labetalol 100 BID   To check home BPs weekly goal is 140 /80s  Pt aware of incr risk Preeclampsia / had it last preg at 39 weeks  PLAN:  32w NST or BPP weekly , deliver at 37 w   Growth scans at 28w, 32w,  36 w    Home BPs 136/ 76  highest. Some as low as 110/70.  Intermittent headache, resolves with medication.         Rubella non-immune status, antepartum ([facility])    Overview   Vaccine postpartum          Single umbilical artery ([facility])    Overview   Seen at 20 weeks   Follow up growth scans          Anemia during pregnancy in third trimester    Overview   Added iron RX daily               RTC in 2 weeks      Deepti Diaz MD

## 2025-06-10 PROBLEM — Z3A.31 31 WEEKS GESTATION OF PREGNANCY (HHS-HCC): Status: ACTIVE | Noted: 2024-12-23

## 2025-06-11 ENCOUNTER — APPOINTMENT (OUTPATIENT)
Dept: OBSTETRICS AND GYNECOLOGY | Facility: CLINIC | Age: 30
End: 2025-06-11
Payer: COMMERCIAL

## 2025-06-11 VITALS — SYSTOLIC BLOOD PRESSURE: 145 MMHG | WEIGHT: 265 LBS | BODY MASS INDEX: 46.37 KG/M2 | DIASTOLIC BLOOD PRESSURE: 78 MMHG

## 2025-06-11 DIAGNOSIS — O99.019 ANTEPARTUM ANEMIA: ICD-10-CM

## 2025-06-11 DIAGNOSIS — Z3A.31 31 WEEKS GESTATION OF PREGNANCY (HHS-HCC): Primary | ICD-10-CM

## 2025-06-11 DIAGNOSIS — J45.990 EXERCISE-INDUCED ASTHMA: ICD-10-CM

## 2025-06-11 DIAGNOSIS — O10.919 HTN IN PREGNANCY, CHRONIC (HHS-HCC): ICD-10-CM

## 2025-06-11 DIAGNOSIS — Z3A.30 30 WEEKS GESTATION OF PREGNANCY (HHS-HCC): Primary | ICD-10-CM

## 2025-06-11 DIAGNOSIS — Z28.39 RUBELLA NON-IMMUNE STATUS, ANTEPARTUM (HHS-HCC): ICD-10-CM

## 2025-06-11 DIAGNOSIS — E66.813 CLASS 3 SEVERE OBESITY WITH BODY MASS INDEX (BMI) OF 40.0 TO 44.9 IN ADULT: ICD-10-CM

## 2025-06-11 DIAGNOSIS — K21.9 GASTROESOPHAGEAL REFLUX DISEASE WITHOUT ESOPHAGITIS: ICD-10-CM

## 2025-06-11 DIAGNOSIS — O99.013 ANEMIA DURING PREGNANCY IN THIRD TRIMESTER: Primary | ICD-10-CM

## 2025-06-11 DIAGNOSIS — O09.899 RUBELLA NON-IMMUNE STATUS, ANTEPARTUM (HHS-HCC): ICD-10-CM

## 2025-06-11 DIAGNOSIS — Q27.0 SINGLE UMBILICAL ARTERY (HHS-HCC): ICD-10-CM

## 2025-06-11 PROBLEM — O36.8130 DECREASED FETAL MOVEMENTS IN THIRD TRIMESTER (HHS-HCC): Status: ACTIVE | Noted: 2025-06-11

## 2025-06-11 PROCEDURE — 0501F PRENATAL FLOW SHEET: CPT | Performed by: OBSTETRICS & GYNECOLOGY

## 2025-06-11 RX ORDER — EPINEPHRINE 0.3 MG/.3ML
0.3 INJECTION SUBCUTANEOUS EVERY 5 MIN PRN
Status: CANCELLED | OUTPATIENT
Start: 2025-06-11

## 2025-06-11 RX ORDER — FERROUS SULFATE 325(65) MG
TABLET, DELAYED RELEASE (ENTERIC COATED) ORAL
COMMUNITY
Start: 2025-06-08

## 2025-06-11 RX ORDER — ALBUTEROL SULFATE 0.83 MG/ML
3 SOLUTION RESPIRATORY (INHALATION) AS NEEDED
Status: CANCELLED | OUTPATIENT
Start: 2025-06-11

## 2025-06-11 RX ORDER — FAMOTIDINE 10 MG/ML
20 INJECTION, SOLUTION INTRAVENOUS ONCE AS NEEDED
Status: CANCELLED | OUTPATIENT
Start: 2025-06-11

## 2025-06-11 RX ORDER — DIPHENHYDRAMINE HYDROCHLORIDE 50 MG/ML
50 INJECTION, SOLUTION INTRAMUSCULAR; INTRAVENOUS AS NEEDED
Status: CANCELLED | OUTPATIENT
Start: 2025-06-11

## 2025-06-11 NOTE — Clinical Note
Good morning! This patient was put on PO iron for anemia during pregnancy but is asking to start infusions. Thanks!

## 2025-06-16 ENCOUNTER — INFUSION (OUTPATIENT)
Dept: HEMATOLOGY/ONCOLOGY | Facility: CLINIC | Age: 30
End: 2025-06-16
Payer: COMMERCIAL

## 2025-06-16 VITALS
RESPIRATION RATE: 20 BRPM | OXYGEN SATURATION: 99 % | HEART RATE: 83 BPM | DIASTOLIC BLOOD PRESSURE: 80 MMHG | TEMPERATURE: 98.6 F | SYSTOLIC BLOOD PRESSURE: 118 MMHG

## 2025-06-16 DIAGNOSIS — O99.013 ANEMIA DURING PREGNANCY IN THIRD TRIMESTER: ICD-10-CM

## 2025-06-16 PROCEDURE — 96365 THER/PROPH/DIAG IV INF INIT: CPT | Mod: INF

## 2025-06-16 PROCEDURE — 2500000004 HC RX 250 GENERAL PHARMACY W/ HCPCS (ALT 636 FOR OP/ED): Performed by: OBSTETRICS & GYNECOLOGY

## 2025-06-16 RX ORDER — FAMOTIDINE 10 MG/ML
20 INJECTION, SOLUTION INTRAVENOUS ONCE AS NEEDED
Status: CANCELLED | OUTPATIENT
Start: 2025-06-20

## 2025-06-16 RX ORDER — EPINEPHRINE 0.3 MG/.3ML
0.3 INJECTION SUBCUTANEOUS EVERY 5 MIN PRN
Status: CANCELLED | OUTPATIENT
Start: 2025-06-20

## 2025-06-16 RX ORDER — ALBUTEROL SULFATE 0.83 MG/ML
3 SOLUTION RESPIRATORY (INHALATION) AS NEEDED
Status: CANCELLED | OUTPATIENT
Start: 2025-06-20

## 2025-06-16 RX ORDER — DIPHENHYDRAMINE HYDROCHLORIDE 50 MG/ML
50 INJECTION, SOLUTION INTRAMUSCULAR; INTRAVENOUS AS NEEDED
Status: CANCELLED | OUTPATIENT
Start: 2025-06-20

## 2025-06-16 RX ADMIN — IRON SUCROSE 300.01 MG: 20 INJECTION, SOLUTION INTRAVENOUS at 11:12

## 2025-06-16 ASSESSMENT — PAIN SCALES - GENERAL: PAINLEVEL_OUTOF10: 0-NO PAIN

## 2025-06-16 NOTE — PROGRESS NOTES
Patient given drug information sheet. Denies any questions.Patient received Venofer infusion without sign of adverse reaction.  Patient monitored for 30 minutes post . To return 4 days. Patient discharged in stable condition.   Pt. Rescheduled for 11/03/2020 at 10:15. Pt informed and verbalized understanding

## 2025-06-18 ENCOUNTER — HOSPITAL ENCOUNTER (OUTPATIENT)
Facility: HOSPITAL | Age: 30
Discharge: HOME | End: 2025-06-18
Attending: STUDENT IN AN ORGANIZED HEALTH CARE EDUCATION/TRAINING PROGRAM | Admitting: STUDENT IN AN ORGANIZED HEALTH CARE EDUCATION/TRAINING PROGRAM
Payer: COMMERCIAL

## 2025-06-18 ENCOUNTER — HOSPITAL ENCOUNTER (OUTPATIENT)
Facility: HOSPITAL | Age: 30
End: 2025-06-18
Attending: STUDENT IN AN ORGANIZED HEALTH CARE EDUCATION/TRAINING PROGRAM | Admitting: STUDENT IN AN ORGANIZED HEALTH CARE EDUCATION/TRAINING PROGRAM
Payer: COMMERCIAL

## 2025-06-18 ENCOUNTER — TELEPHONE (OUTPATIENT)
Dept: OBSTETRICS AND GYNECOLOGY | Facility: HOSPITAL | Age: 30
End: 2025-06-18
Payer: COMMERCIAL

## 2025-06-18 VITALS
RESPIRATION RATE: 19 BRPM | TEMPERATURE: 98.4 F | WEIGHT: 266.98 LBS | SYSTOLIC BLOOD PRESSURE: 101 MMHG | OXYGEN SATURATION: 99 % | HEART RATE: 85 BPM | HEIGHT: 64 IN | BODY MASS INDEX: 45.58 KG/M2 | DIASTOLIC BLOOD PRESSURE: 57 MMHG

## 2025-06-18 LAB
BILIRUBIN, POC: NEGATIVE
BLOOD URINE, POC: NEGATIVE
CLARITY, POC: CLEAR
COLOR, POC: YELLOW
GLUCOSE URINE, POC: NEGATIVE
KETONES, POC: NEGATIVE
LEUKOCYTE EST, POC: NORMAL
NITRITE, POC: NEGATIVE
PH, POC: 6.5
POC APPEARANCE OF BODY FLUID: CLEAR
SPECIFIC GRAVITY, POC: 1.02
URINE PROTEIN, POC: NORMAL
UROBILINOGEN, POC: 0.2

## 2025-06-18 PROCEDURE — 81002 URINALYSIS NONAUTO W/O SCOPE: CPT

## 2025-06-18 PROCEDURE — 99213 OFFICE O/P EST LOW 20 MIN: CPT

## 2025-06-18 PROCEDURE — 99214 OFFICE O/P EST MOD 30 MIN: CPT

## 2025-06-18 RX ORDER — LIDOCAINE HYDROCHLORIDE 10 MG/ML
0.5 INJECTION, SOLUTION INFILTRATION; PERINEURAL ONCE AS NEEDED
Status: DISCONTINUED | OUTPATIENT
Start: 2025-06-18 | End: 2025-06-19 | Stop reason: HOSPADM

## 2025-06-18 RX ORDER — NIFEDIPINE 10 MG/1
10 CAPSULE ORAL ONCE AS NEEDED
Status: DISCONTINUED | OUTPATIENT
Start: 2025-06-18 | End: 2025-06-19 | Stop reason: HOSPADM

## 2025-06-18 RX ORDER — ONDANSETRON 4 MG/1
4 TABLET, FILM COATED ORAL EVERY 6 HOURS PRN
Status: DISCONTINUED | OUTPATIENT
Start: 2025-06-18 | End: 2025-06-19 | Stop reason: HOSPADM

## 2025-06-18 RX ORDER — LABETALOL HYDROCHLORIDE 5 MG/ML
20 INJECTION, SOLUTION INTRAVENOUS ONCE AS NEEDED
Status: DISCONTINUED | OUTPATIENT
Start: 2025-06-18 | End: 2025-06-19 | Stop reason: HOSPADM

## 2025-06-18 RX ORDER — HYDRALAZINE HYDROCHLORIDE 20 MG/ML
5 INJECTION INTRAMUSCULAR; INTRAVENOUS ONCE AS NEEDED
Status: DISCONTINUED | OUTPATIENT
Start: 2025-06-18 | End: 2025-06-19 | Stop reason: HOSPADM

## 2025-06-18 RX ORDER — ONDANSETRON HYDROCHLORIDE 2 MG/ML
4 INJECTION, SOLUTION INTRAVENOUS EVERY 6 HOURS PRN
Status: DISCONTINUED | OUTPATIENT
Start: 2025-06-18 | End: 2025-06-19 | Stop reason: HOSPADM

## 2025-06-18 SDOH — HEALTH STABILITY: MENTAL HEALTH: WISH TO BE DEAD (PAST 1 MONTH): NO

## 2025-06-18 SDOH — SOCIAL STABILITY: SOCIAL INSECURITY: DOES ANYONE TRY TO KEEP YOU FROM HAVING/CONTACTING OTHER FRIENDS OR DOING THINGS OUTSIDE YOUR HOME?: NO

## 2025-06-18 SDOH — HEALTH STABILITY: MENTAL HEALTH: SUICIDAL BEHAVIOR (LIFETIME): NO

## 2025-06-18 SDOH — SOCIAL STABILITY: SOCIAL INSECURITY: HAVE YOU HAD ANY THOUGHTS OF HARMING ANYONE ELSE?: NO

## 2025-06-18 SDOH — SOCIAL STABILITY: SOCIAL INSECURITY: HAS ANYONE EVER THREATENED TO HURT YOUR FAMILY OR YOUR PETS?: NO

## 2025-06-18 SDOH — SOCIAL STABILITY: SOCIAL INSECURITY: HAVE YOU HAD THOUGHTS OF HARMING ANYONE ELSE?: NO

## 2025-06-18 SDOH — SOCIAL STABILITY: SOCIAL INSECURITY: DO YOU FEEL ANYONE HAS EXPLOITED OR TAKEN ADVANTAGE OF YOU FINANCIALLY OR OF YOUR PERSONAL PROPERTY?: NO

## 2025-06-18 SDOH — HEALTH STABILITY: MENTAL HEALTH: NON-SPECIFIC ACTIVE SUICIDAL THOUGHTS (PAST 1 MONTH): NO

## 2025-06-18 SDOH — HEALTH STABILITY: MENTAL HEALTH: WERE YOU ABLE TO COMPLETE ALL THE BEHAVIORAL HEALTH SCREENINGS?: YES

## 2025-06-18 SDOH — SOCIAL STABILITY: SOCIAL INSECURITY: ABUSE SCREEN: ADULT

## 2025-06-18 SDOH — HEALTH STABILITY: MENTAL HEALTH: HAVE YOU USED ANY SUBSTANCES (CANABIS, COCAINE, HEROIN, HALLUCINOGENS, INHALANTS, ETC.) IN THE PAST 12 MONTHS?: NO

## 2025-06-18 SDOH — SOCIAL STABILITY: SOCIAL INSECURITY: ARE THERE ANY APPARENT SIGNS OF INJURIES/BEHAVIORS THAT COULD BE RELATED TO ABUSE/NEGLECT?: NO

## 2025-06-18 SDOH — SOCIAL STABILITY: SOCIAL INSECURITY: ARE YOU OR HAVE YOU BEEN THREATENED OR ABUSED PHYSICALLY, EMOTIONALLY, OR SEXUALLY BY ANYONE?: NO

## 2025-06-18 SDOH — SOCIAL STABILITY: SOCIAL INSECURITY: PHYSICAL ABUSE: DENIES

## 2025-06-18 SDOH — HEALTH STABILITY: MENTAL HEALTH: HAVE YOU USED ANY PRESCRIPTION DRUGS OTHER THAN PRESCRIBED IN THE PAST 12 MONTHS?: NO

## 2025-06-18 SDOH — SOCIAL STABILITY: SOCIAL INSECURITY: VERBAL ABUSE: DENIES

## 2025-06-18 ASSESSMENT — LIFESTYLE VARIABLES
HOW OFTEN DO YOU HAVE 6 OR MORE DRINKS ON ONE OCCASION: NEVER
AUDIT-C TOTAL SCORE: 0
AUDIT-C TOTAL SCORE: 0
HOW MANY STANDARD DRINKS CONTAINING ALCOHOL DO YOU HAVE ON A TYPICAL DAY: PATIENT DOES NOT DRINK
HOW OFTEN DO YOU HAVE A DRINK CONTAINING ALCOHOL: NEVER
SKIP TO QUESTIONS 9-10: 1

## 2025-06-18 ASSESSMENT — PATIENT HEALTH QUESTIONNAIRE - PHQ9
SUM OF ALL RESPONSES TO PHQ9 QUESTIONS 1 & 2: 0
1. LITTLE INTEREST OR PLEASURE IN DOING THINGS: NOT AT ALL
2. FEELING DOWN, DEPRESSED OR HOPELESS: NOT AT ALL

## 2025-06-18 NOTE — TELEPHONE ENCOUNTER
Patient called with c/o leakage of fluid and cramping.  Has been cramping for last 24 hours.  Has had three or more separate gushes of fluid starting yesterday, each soaked her underwear but also not necessarily consistently leaking.  Given GA of 32 wks, recommend eval at [facility].  Patient agreeable and will head to [facility] for triage visit.    Che Domingo MD

## 2025-06-19 ENCOUNTER — HOSPITAL ENCOUNTER (OUTPATIENT)
Dept: RADIOLOGY | Facility: CLINIC | Age: 30
Discharge: HOME | End: 2025-06-19
Payer: COMMERCIAL

## 2025-06-19 DIAGNOSIS — Z34.91 PRENATAL CARE IN FIRST TRIMESTER: ICD-10-CM

## 2025-06-19 DIAGNOSIS — O10.013 PRE-EXISTING ESSENTIAL HYPERTENSION COMPLICATING PREGNANCY, THIRD TRIMESTER: ICD-10-CM

## 2025-06-19 DIAGNOSIS — O99.213 OBESITY COMPLICATING PREGNANCY, THIRD TRIMESTER (HHS-HCC): ICD-10-CM

## 2025-06-19 PROCEDURE — 76819 FETAL BIOPHYS PROFIL W/O NST: CPT

## 2025-06-19 PROCEDURE — 76816 OB US FOLLOW-UP PER FETUS: CPT

## 2025-06-19 NOTE — H&P
OB Triage H&P    Assessment/Plan    Karine Ruiz is a 29 y.o.  at 32w1d, GLORIA: 2025 by LMP c/w 13 wk US who presents to triage with r/o rupture.    R/o PPROM  - Negative for pooling, ferning and nitrazine on SSE   - AMANDA 11.2 cm   - Low concern for PPROM at this time given findings stated above. Strict return precautions discussed prior to discharge    Fetal status   - NST reactive, BPP 10/10  - Patient reporting good fetal movement; encouraged to continue daily assessments of fetal movement     Discussed plan and reviewed with: Dr. Uribe, Dr.Galun Janie Cooper, M4    I saw and evaluated the patient with the medical student. I have made any necessary modifications within the body of text.  Zulema Uribe MD, PGY-3      Pregnancy Problems (from 24 to present)       Problem Noted Diagnosed Resolved    Decreased fetal movements in third trimester ([facility]) 2025 by Deepti Diaz MD  No    Priority:  Medium       Overview Signed 2025 12:03 PM by Deepti Diaz MD   Bedside BPP 8. AMANDA subjectively normal.          Anemia during pregnancy in third trimester 2025 by Gaby Thayer MD  No    Priority:  Medium       Overview Addendum 2025 12:04 PM by Deepti Diaz MD   Added iron RX daily   -pt requested iron infusions         Single umbilical artery ([facility]) 4/3/2025 by Gaby Thayer MD  No    Priority:  Medium       Overview Signed 2025  9:05 PM by Gaby Thayer MD   Seen at 20 weeks   Follow up growth scans          Rubella non-immune status, antepartum ([facility]) 2025 by Gaby Thayer MD  No    Priority:  Medium       Overview Signed 2025  2:11 PM by Gaby Thayer MD   Vaccine postpartum          HTN in pregnancy, chronic ([facility]) 2025 by Gaby Thayer MD  No    Priority:  Medium       Overview Addendum 2025 12:03 PM by Deepti Diaz MD   CHTN on Labetalol 100 BID   To check home BPs weekly  goal is 140 /80s  Pt aware of incr risk Preeclampsia / had it last preg at 39 weeks  PLAN:  32w NST or BPP weekly , deliver at 37 w   Growth scans at 28w, 32w,  36 w    Home BPs 136/ 76  highest. Some as low as 110/70.  Intermittent headache, resolves with medication.         31 weeks gestation of pregnancy ([facility]) 2024 by Gaby Thayer MD  No    Priority:  Medium       Overview Addendum 2025 11:35 AM by Deepti Diaz MD   Desired provider in labor: [] CNM  [x] Physician  [x] Blood Products: [x] Yes, accepts [] No, needs counseling  [x] Initial BMI: 41.86   [x] Prenatal Labs:   [x] Cervical Cancer Screening up to date wnl 2024  [x] Rh status: A pos   [x] Genetic Screening:  XX cfDNA wnl, carrier screen neg   [x] NT US: (11-13 wks)  [x] Baby ASA (if indicated):  [x] Pregnancy dated by: LMP    [x] Anatomy US: (19-20 wks)  [] Federal Sterilization consent signed (if indicated):  [x] 1hr GCT at 24-28wks: wnl  [x] Rhogam (if indicated):   No  A pos   [] Fetal Surveillance (if indicated):  [x] Tdap (27-32 wks, may be given up to 36 wks if initial window missed):     [] Breastfeeding:  [] Postpartum Birth control method:   [] GBS at 36 - 37 wks:  [] 39 weeks discussion of IOL vs. Expectant management:  [] Mode of delivery ( anticipated ):          Class 3 severe obesity with body mass index (BMI) of 40.0 to 44.9 in adult 2023 by German Sharp DO  No    Priority:  Medium       Overview Addendum 2025  2:26 PM by Gbay Thayer MD   Healthy diet discussed - wt gain goal 20 lbs max   Follow up growth scans and Fetal ECHO = wnl                  Subjective   28 yo  at 32.2 wga by lmp c/w 13 week US presenting with c/o LOF. Pt described noticing that her underwear was moistened on 3 separate occasions throughout the day. Denies large gush of fluid. Endorses a 24-hr history of increased cramping and infrequent contractions that feel like period cramps. Reports good fetal movement.  Denies vaginal bleeding.    Pregnancy notable for:   - FELIZ   - Asthma   - cHTN, on Labetalol 100 mg BID   - Rubella non immune   - Single umbilical artery     Prenatal Provider: MD Flaca     OB History    Para Term  AB Living   2 1 1 0 0 1   SAB IAB Ectopic Multiple Live Births   0 0 0 0 1      # Outcome Date GA Lbr Phillip/2nd Weight Sex Type Anes PTL Lv   2 Current            1 Term 16 39w1d  3.487 kg F Vag-Spont EPI  MARCI      Name: Rose       Surgical History[1]    Social History     Tobacco Use    Smoking status: Never    Smokeless tobacco: Never   Substance Use Topics    Alcohol use: Not Currently     Comment: socially       Allergies[2]    Prescriptions Prior to Admission[3]  Objective     Last Vitals  Temp Pulse Resp BP MAP O2 Sat   36.2 °C (97.2 °F)  (pulse ox not on finger.) 18 136/58 83  (pulse ox not on finger.)     Blood Pressures         2025             BP: 136/58             Physical Exam  General: NAD, mood appropriate  Cardiopulmonary: warm and well perfused, breathing comfortably on room air  Abdomen: Gravid, non-tender  Extremities: Symmetric  Speculum Exam: no pooling of fluid seen, Nitrazine test is negative, Ferning test is negative, vaginal discharge physiologic in appearance   Cervix: Fingertip /0 /-3     Chaperone Present: Yes.  Chaperone Name/Title: Karine Dickson RN  Examination Chaperoned: cervical exam     Fetal Monitoring  Baseline: 145 bpm, Variability: moderate,  Accelerations: present and Decelerations: none  Uterine Activity: No contractions seen on toco  Interpretation: Reactive    Bedside ultrasound: Yes  Cephalic presentation   BPP 10/10  AMANDA 11.2 cm    Labs in chart were reviewed.     Prenatal labs reviewed, remarkable for Hgb 10.2.         [1]   Past Surgical History:  Procedure Laterality Date    OTHER SURGICAL HISTORY  2016    Esophagoscopy Rigid    OTHER SURGICAL HISTORY  2019    [place] tooth extraction    OTHER SURGICAL HISTORY   04/22/2019    Esophagogastroduodenoscopy    WISDOM TOOTH EXTRACTION     [2]   Allergies  Allergen Reactions    Bee Pollen Unknown    Bee Venom Protein (Honey Bee) Unknown    Cat Dander Unknown   [3]   Medications Prior to Admission   Medication Sig Dispense Refill Last Dose/Taking    aspirin 81 mg chewable tablet Chew 2 tablets (162 mg) once daily. 60 tablet 11 6/18/2025 Morning    cetirizine (ZyrTEC) 10 mg tablet Zyrtec 10 MG TABS   Refills: 0       Active   6/18/2025 Morning    ferrous sulfate 325 (65 Fe) mg EC tablet    Past Week    labetalol (Normodyne) 100 mg tablet Take 1 tablet (100 mg) by mouth 2 times a day. 180 tablet 1 6/18/2025 at  7:30 PM    pantoprazole (ProtoNix) 20 mg EC tablet TAKE 1 TABLET DAILY 90 tablet 3 6/18/2025 Morning    PNV no.103/folic/om3s/fish oil (PRENATAL WITH DHA-FOLIC ACID ORAL) Take by mouth once daily.   6/18/2025    albuterol 1.25 mg/3 mL nebulizer solution Albuterol Sulfate NEBU   Refills: 0       Active (Patient not taking: Reported on 5/19/2025)       albuterol 90 mcg/actuation inhaler Inhale 2 puffs every 6 hours if needed for wheezing.   Unknown    albuterol-budesonide (Airsupra) 90-80 mcg/actuation inhaler Inhale 2 puffs every 4 6 hours as needed for shortness of breath or wheezing, or before exercise. (Patient not taking: Reported on 5/19/2025) 10.7 g 11     B.animalis,bifid,infantis,long (PROBIOTIC 4X ORAL) Take by mouth. (Patient not taking: Reported on 5/19/2025)       cholecalciferol (Vitamin D-3) 25 MCG (1000 UT) capsule Vitamin D-3 CAPS   Refills: 0       Active (Patient not taking: Reported on 1/20/2025)       ferrous sulfate 325 mg (65 mg elemental) tablet Take 1 tablet by mouth once daily. 30 tablet 3     omega 3-dha-epa-fish oil 967 mg-385 mg- 515 mg-1,290 mg capsule,delayed release(DR/EC) Take by mouth. (Patient not taking: Reported on 1/20/2025)

## 2025-06-20 ENCOUNTER — INFUSION (OUTPATIENT)
Dept: HEMATOLOGY/ONCOLOGY | Facility: CLINIC | Age: 30
End: 2025-06-20
Payer: COMMERCIAL

## 2025-06-20 VITALS
OXYGEN SATURATION: 98 % | TEMPERATURE: 97.7 F | SYSTOLIC BLOOD PRESSURE: 148 MMHG | DIASTOLIC BLOOD PRESSURE: 76 MMHG | HEART RATE: 80 BPM | RESPIRATION RATE: 20 BRPM

## 2025-06-20 DIAGNOSIS — O99.013 ANEMIA DURING PREGNANCY IN THIRD TRIMESTER: ICD-10-CM

## 2025-06-20 PROCEDURE — 96365 THER/PROPH/DIAG IV INF INIT: CPT | Mod: INF

## 2025-06-20 PROCEDURE — 2500000004 HC RX 250 GENERAL PHARMACY W/ HCPCS (ALT 636 FOR OP/ED): Performed by: OBSTETRICS & GYNECOLOGY

## 2025-06-20 RX ORDER — EPINEPHRINE 0.3 MG/.3ML
0.3 INJECTION SUBCUTANEOUS EVERY 5 MIN PRN
OUTPATIENT
Start: 2025-06-24

## 2025-06-20 RX ORDER — ALBUTEROL SULFATE 0.83 MG/ML
3 SOLUTION RESPIRATORY (INHALATION) AS NEEDED
OUTPATIENT
Start: 2025-06-24

## 2025-06-20 RX ORDER — DIPHENHYDRAMINE HYDROCHLORIDE 50 MG/ML
50 INJECTION, SOLUTION INTRAMUSCULAR; INTRAVENOUS AS NEEDED
OUTPATIENT
Start: 2025-06-24

## 2025-06-20 RX ORDER — FAMOTIDINE 10 MG/ML
20 INJECTION, SOLUTION INTRAVENOUS ONCE AS NEEDED
OUTPATIENT
Start: 2025-06-24

## 2025-06-20 RX ADMIN — IRON SUCROSE 300 MG: 20 INJECTION, SOLUTION INTRAVENOUS at 11:12

## 2025-06-20 ASSESSMENT — PAIN SCALES - GENERAL: PAINLEVEL_OUTOF10: 0-NO PAIN

## 2025-06-20 NOTE — PROGRESS NOTES
1245:  Patient received Venofer 300 mg infusion (#2/3) without sign of adverse reaction.  To return Tuesday.  Discharged in stable condition.

## 2025-06-23 ENCOUNTER — APPOINTMENT (OUTPATIENT)
Facility: CLINIC | Age: 30
End: 2025-06-23
Payer: COMMERCIAL

## 2025-06-23 ENCOUNTER — PREP FOR PROCEDURE (OUTPATIENT)
Facility: CLINIC | Age: 30
End: 2025-06-23

## 2025-06-23 VITALS — SYSTOLIC BLOOD PRESSURE: 126 MMHG | WEIGHT: 267.8 LBS | DIASTOLIC BLOOD PRESSURE: 80 MMHG | BODY MASS INDEX: 45.97 KG/M2

## 2025-06-23 DIAGNOSIS — O10.919 HTN IN PREGNANCY, CHRONIC (HHS-HCC): Primary | ICD-10-CM

## 2025-06-23 DIAGNOSIS — O99.013 ANEMIA DURING PREGNANCY IN THIRD TRIMESTER: ICD-10-CM

## 2025-06-23 DIAGNOSIS — E66.813 CLASS 3 SEVERE OBESITY WITH BODY MASS INDEX (BMI) OF 40.0 TO 44.9 IN ADULT: ICD-10-CM

## 2025-06-23 DIAGNOSIS — Q27.0 SINGLE UMBILICAL ARTERY (HHS-HCC): ICD-10-CM

## 2025-06-23 PROCEDURE — 0501F PRENATAL FLOW SHEET: CPT | Performed by: OBSTETRICS & GYNECOLOGY

## 2025-06-23 PROCEDURE — 59025 FETAL NON-STRESS TEST: CPT | Performed by: OBSTETRICS & GYNECOLOGY

## 2025-06-23 NOTE — PROGRESS NOTES
Doing well. No complaints.   NST     FHR reactive, baseline 150s , no decels, at least 2 accelerations noted.  Audible fetal mvmts.  No ctxs.           Medical Problems       Problem List       HTN in pregnancy, chronic ([facility])    Overview Addendum 6/22/2025  9:22 PM by Gaby Thayer MD   CHTN on Labetalol 100 BID   To check home BPs weekly goal is 140 /80s=== highest 136/76  Pt aware of incr risk Preeclampsia / had it last preg at 39 weeks  PLAN:   NST or BPP weekly , deliver at 37 w == pt wants 38 week IOL , aware of risks of PEC , to follow closely   Growth scans at 28w, 32w,  36 w           Class 3 severe obesity with body mass index (BMI) of 40.0 to 44.9 in adult    Overview Addendum 5/29/2025  2:26 PM by Gaby Thayer MD   Healthy diet discussed - wt gain goal 20 lbs max   Follow up growth scans and Fetal ECHO = wnl          Gastroesophageal reflux disease    Overview Signed 12/23/2024 10:53 AM by Gaby Thayer MD   On Protonix - safe in preg         Exercise-induced asthma    Overview Signed 12/23/2024 11:11 AM by Gaby Thayer MD   Has albuterol inhaler prn          33 weeks gestation of pregnancy ([facility])    Overview Addendum 6/22/2025  9:24 PM by Gaby Thayer MD   Desired provider in labor: [] CNM  [x] Physician  [x] Blood Products: [x] Yes, accepts [] No, needs counseling  [x] Initial BMI: 41.86   [x] Prenatal Labs:   [x] Cervical Cancer Screening up to date wnl 8/2024  [x] Rh status: A pos   [x] Genetic Screening:  XX cfDNA wnl, carrier screen neg   [x] NT US: (11-13 wks)  [x] Baby ASA (if indicated):  [x] Pregnancy dated by: LMP    [x] Anatomy US: (19-20 wks)  [] Federal Sterilization consent signed (if indicated):  [x] 1hr GCT at 24-28wks: wnl  [x] Rhogam (if indicated):   No  A pos   [] Fetal Surveillance (if indicated): weekly NST 32 wk   [x] Tdap (27-32 wks, may be given up to 36 wks if initial window missed):     [x] Breastfeeding: yes  [] Postpartum Birth control  method: pills  [] GBS at 36 - 37 wks:  [] 39 weeks discussion of IOL vs. Expectant management: IOL at 37 wk   [] Mode of delivery ( anticipated ):          Rubella non-immune status, antepartum ([facility])    Overview Signed 1/23/2025  2:11 PM by Gaby Thayer MD   Vaccine postpartum          Single umbilical artery ([facility])    Overview Signed 4/5/2025  9:05 PM by Gaby Thayer MD   Seen at 20 weeks   Follow up growth scans          Anemia during pregnancy in third trimester    Overview Addendum 6/11/2025 12:04 PM by Deepti Diaz MD   Added iron RX daily   6/11-pt requested iron infusions has 3 total then recheck labs

## 2025-06-24 ENCOUNTER — INFUSION (OUTPATIENT)
Dept: HEMATOLOGY/ONCOLOGY | Facility: CLINIC | Age: 30
End: 2025-06-24
Payer: COMMERCIAL

## 2025-06-24 VITALS
DIASTOLIC BLOOD PRESSURE: 73 MMHG | OXYGEN SATURATION: 97 % | TEMPERATURE: 98.4 F | HEART RATE: 85 BPM | SYSTOLIC BLOOD PRESSURE: 140 MMHG | RESPIRATION RATE: 20 BRPM

## 2025-06-24 DIAGNOSIS — O99.013 ANEMIA DURING PREGNANCY IN THIRD TRIMESTER: ICD-10-CM

## 2025-06-24 PROCEDURE — 96366 THER/PROPH/DIAG IV INF ADDON: CPT | Mod: INF

## 2025-06-24 PROCEDURE — 2500000004 HC RX 250 GENERAL PHARMACY W/ HCPCS (ALT 636 FOR OP/ED)

## 2025-06-24 PROCEDURE — 96365 THER/PROPH/DIAG IV INF INIT: CPT | Mod: INF

## 2025-06-24 RX ORDER — FAMOTIDINE 10 MG/ML
20 INJECTION, SOLUTION INTRAVENOUS ONCE AS NEEDED
OUTPATIENT
Start: 2025-06-24

## 2025-06-24 RX ORDER — EPINEPHRINE 0.3 MG/.3ML
0.3 INJECTION SUBCUTANEOUS EVERY 5 MIN PRN
OUTPATIENT
Start: 2025-06-24

## 2025-06-24 RX ORDER — DIPHENHYDRAMINE HYDROCHLORIDE 50 MG/ML
50 INJECTION, SOLUTION INTRAMUSCULAR; INTRAVENOUS AS NEEDED
OUTPATIENT
Start: 2025-06-24

## 2025-06-24 RX ORDER — ALBUTEROL SULFATE 0.83 MG/ML
3 SOLUTION RESPIRATORY (INHALATION) AS NEEDED
OUTPATIENT
Start: 2025-06-24

## 2025-06-24 RX ADMIN — IRON SUCROSE 300 MG: 20 INJECTION, SOLUTION INTRAVENOUS at 11:20

## 2025-06-24 ASSESSMENT — PAIN SCALES - GENERAL: PAINLEVEL_OUTOF10: 0-NO PAIN

## 2025-06-30 ENCOUNTER — LAB (OUTPATIENT)
Dept: LAB | Facility: HOSPITAL | Age: 30
End: 2025-06-30
Payer: COMMERCIAL

## 2025-06-30 ENCOUNTER — APPOINTMENT (OUTPATIENT)
Dept: LAB | Facility: HOSPITAL | Age: 30
End: 2025-06-30
Payer: COMMERCIAL

## 2025-06-30 ENCOUNTER — APPOINTMENT (OUTPATIENT)
Facility: CLINIC | Age: 30
End: 2025-06-30
Payer: COMMERCIAL

## 2025-06-30 VITALS — BODY MASS INDEX: 44.97 KG/M2 | SYSTOLIC BLOOD PRESSURE: 126 MMHG | DIASTOLIC BLOOD PRESSURE: 81 MMHG | WEIGHT: 262 LBS

## 2025-06-30 DIAGNOSIS — E66.813 CLASS 3 SEVERE OBESITY WITH BODY MASS INDEX (BMI) OF 40.0 TO 44.9 IN ADULT: ICD-10-CM

## 2025-06-30 DIAGNOSIS — Z3A.34 34 WEEKS GESTATION OF PREGNANCY (HHS-HCC): ICD-10-CM

## 2025-06-30 DIAGNOSIS — O99.013 ANEMIA COMPLICATING PREGNANCY, THIRD TRIMESTER: ICD-10-CM

## 2025-06-30 DIAGNOSIS — O10.919 HTN IN PREGNANCY, CHRONIC (HHS-HCC): Primary | ICD-10-CM

## 2025-06-30 DIAGNOSIS — O99.013 ANEMIA COMPLICATING PREGNANCY, THIRD TRIMESTER: Primary | ICD-10-CM

## 2025-06-30 DIAGNOSIS — Q27.0 SINGLE UMBILICAL ARTERY (HHS-HCC): ICD-10-CM

## 2025-06-30 PROBLEM — O36.8130 DECREASED FETAL MOVEMENTS IN THIRD TRIMESTER (HHS-HCC): Status: RESOLVED | Noted: 2025-06-11 | Resolved: 2025-06-30

## 2025-06-30 PROCEDURE — 0501F PRENATAL FLOW SHEET: CPT | Performed by: OBSTETRICS & GYNECOLOGY

## 2025-06-30 PROCEDURE — 85027 COMPLETE CBC AUTOMATED: CPT

## 2025-06-30 PROCEDURE — 82728 ASSAY OF FERRITIN: CPT

## 2025-06-30 PROCEDURE — 83550 IRON BINDING TEST: CPT

## 2025-06-30 PROCEDURE — 83540 ASSAY OF IRON: CPT

## 2025-06-30 PROCEDURE — 59025 FETAL NON-STRESS TEST: CPT | Performed by: OBSTETRICS & GYNECOLOGY

## 2025-06-30 NOTE — PROGRESS NOTES
NST today.    FHR reactive, baseline 150s , no decels, at least 2 accelerations noted.  Audible fetal mvmts.  No ctxs.       Medical Problems       Problem List       HTN in pregnancy, chronic ([facility])    Overview Addendum 6/30/2025  9:43 AM by Gaby Thayer MD   CHTN on Labetalol 100 BID   To check home BPs weekly goal is 140 /80s  Pt aware of incr risk Preeclampsia / had it last preg at 39 weeks  PLAN:  32w NST or BPP weekly , deliver at 37 w = pt wants 38 week IOL on July 31 8pm so baby can be born Aug 1  , aware of risks of PEC , to follow closely / does Home BP checks    Growth scans at 28w, 32w,  36 w    Home BPs 143 x 1  highest.            Class 3 severe obesity with body mass index (BMI) of 40.0 to 44.9 in adult    Overview Addendum 5/29/2025  2:26 PM by Gaby Thayer MD   Healthy diet discussed - wt gain goal 20 lbs max   Follow up growth scans and Fetal ECHO = wnl          Gastroesophageal reflux disease    Overview Signed 12/23/2024 10:53 AM by Gaby Thayer MD   On Protonix - safe in preg         Exercise-induced asthma    Overview Signed 12/23/2024 11:11 AM by Gaby Thayer MD   Has albuterol inhaler prn          34 weeks gestation of pregnancy ([facility])    Overview Addendum 6/30/2025  9:49 AM by Gaby Thayer MD   Desired provider in labor: [] CNM  [x] Physician  [x] Blood Products: [x] Yes, accepts [] No, needs counseling  [x] Initial BMI: 41.86   [x] Prenatal Labs:   [x] Cervical Cancer Screening up to date wnl 8/2024  [x] Rh status: A pos   [x] Genetic Screening:  XX cfDNA wnl, carrier screen neg   [x] NT US: (11-13 wks)  [x] Baby ASA (if indicated):  [x] Pregnancy dated by: LMP    [x] Anatomy US: (19-20 wks)  [] Federal Sterilization consent signed (if indicated):  [x] 1hr GCT at 24-28wks: wnl  [x] Rhogam (if indicated):   No  A pos   [x] Fetal Surveillance (if indicated): weekly NST 32 wk   [x] Tdap (27-32 wks, may be given up to 36 wks if initial window missed):      [x] Breastfeeding:  [] Postpartum Birth control method:   [] GBS at 36 - 37 wks:  [x] 39 weeks discussion of IOL vs. Expectant management: IOL at 38  wk July 31 8 pm  [] Mode of delivery ( anticipated ):          Rubella non-immune status, antepartum ([facility])    Overview Signed 1/23/2025  2:11 PM by Gaby Thayer MD   Vaccine postpartum          Single umbilical artery ([facility])    Overview Signed 4/5/2025  9:05 PM by Gaby Thayer MD   Seen at 20 weeks   Follow up growth scans          Anemia during pregnancy in third trimester    Overview Addendum 6/30/2025  9:52 AM by Gaby Thayer MD   Added iron RX daily   6/11-pt requested iron infusions x 3

## 2025-07-01 LAB — HOLD SPECIMEN: NORMAL

## 2025-07-07 ENCOUNTER — APPOINTMENT (OUTPATIENT)
Facility: CLINIC | Age: 30
End: 2025-07-07
Payer: COMMERCIAL

## 2025-07-07 VITALS — DIASTOLIC BLOOD PRESSURE: 80 MMHG | WEIGHT: 264.8 LBS | SYSTOLIC BLOOD PRESSURE: 129 MMHG | BODY MASS INDEX: 45.45 KG/M2

## 2025-07-07 DIAGNOSIS — O10.919 HTN IN PREGNANCY, CHRONIC (HHS-HCC): ICD-10-CM

## 2025-07-07 DIAGNOSIS — Z3A.34 34 WEEKS GESTATION OF PREGNANCY (HHS-HCC): ICD-10-CM

## 2025-07-07 DIAGNOSIS — O99.013 ANEMIA DURING PREGNANCY IN THIRD TRIMESTER: ICD-10-CM

## 2025-07-07 DIAGNOSIS — E66.813 CLASS 3 SEVERE OBESITY WITH BODY MASS INDEX (BMI) OF 40.0 TO 44.9 IN ADULT: Primary | ICD-10-CM

## 2025-07-07 PROCEDURE — 0501F PRENATAL FLOW SHEET: CPT | Performed by: OBSTETRICS & GYNECOLOGY

## 2025-07-07 PROCEDURE — 59025 FETAL NON-STRESS TEST: CPT | Performed by: OBSTETRICS & GYNECOLOGY

## 2025-07-14 ENCOUNTER — APPOINTMENT (OUTPATIENT)
Dept: OBSTETRICS AND GYNECOLOGY | Facility: CLINIC | Age: 30
End: 2025-07-14
Payer: COMMERCIAL

## 2025-07-14 VITALS — DIASTOLIC BLOOD PRESSURE: 83 MMHG | BODY MASS INDEX: 45.9 KG/M2 | WEIGHT: 267.4 LBS | SYSTOLIC BLOOD PRESSURE: 124 MMHG

## 2025-07-14 DIAGNOSIS — Z3A.36 36 WEEKS GESTATION OF PREGNANCY (HHS-HCC): Primary | ICD-10-CM

## 2025-07-14 PROCEDURE — 0501F PRENATAL FLOW SHEET: CPT | Performed by: OBSTETRICS & GYNECOLOGY

## 2025-07-14 NOTE — PROGRESS NOTES
GBS today  Has US in 2 days.  No NST today. Baby moving well.   Medical Problems       Problem List       HTN in pregnancy, chronic ([facility])    Overview Addendum 7/14/2025  9:24 AM by Gaby Thayer MD   CHTN on Labetalol 100 BID   To check home BPs weekly goal is 140 /80s  Pt aware of incr risk Preeclampsia / had it last preg at 39 weeks  PLAN:  32w NST or BPP weekly , deliver at 37 w = pt wants 38 week IOL on July 31 8pm so baby can be born Aug 1  , aware of risks of PEC , to follow closely / does Home BP checks    Growth scans at 28w, 32w,  36 w    Home BPs 140s         Class 3 severe obesity with body mass index (BMI) of 40.0 to 44.9 in adult    Overview Addendum 5/29/2025  2:26 PM by Gaby Thayer MD   Healthy diet discussed - wt gain goal 20 lbs max   Follow up growth scans and Fetal ECHO = wnl          Rubella non-immune status, antepartum ([facility])    Overview Signed 1/23/2025  2:11 PM by Gaby Thayer MD   Vaccine postpartum          Single umbilical artery ([facility])    Overview Signed 4/5/2025  9:05 PM by Gaby Thayer MD   Seen at 20 weeks   Follow up growth scans          Anemia during pregnancy in third trimester    Overview Addendum 7/7/2025 10:12 AM by Gaby Thayer MD   Added iron RX daily   6/11-pt requested iron infusions x 3  done         Gastroesophageal reflux disease    Overview Signed 12/23/2024 10:53 AM by Gaby Thayer MD   On Protonix - safe in preg         Exercise-induced asthma    Overview Signed 12/23/2024 11:11 AM by Gaby Thayer MD   Has albuterol inhaler prn          36 weeks gestation of pregnancy ([facility])    Overview Addendum 7/14/2025  9:25 AM by Gaby Thayer MD   Desired provider in labor: [] CNM  [x] Physician  [x] Blood Products: [x] Yes, accepts [] No, needs counseling  [x] Initial BMI: 41.86   [x] Prenatal Labs:   [x] Cervical Cancer Screening up to date wnl 8/2024  [x] Rh status: A pos   [x] Genetic Screening:  XX cfDNA wnl,  carrier screen neg   [x] NT US: (11-13 wks)  [x] Baby ASA (if indicated):  [x] Pregnancy dated by: LMP    [x] Anatomy US: (19-20 wks)  [] Federal Sterilization consent signed (if indicated):  [x] 1hr GCT at 24-28wks: wnl  [x] Rhogam (if indicated):   No  A pos   [x] Fetal Surveillance (if indicated): weekly NST 32 wk   [x] Tdap (27-32 wks, may be given up to 36 wks if initial window missed):     [x] Breastfeeding:  [] Postpartum Birth control method:   [x] GBS at 36 - 37 wks:  [x] 39 weeks discussion of IOL vs. Expectant management: IOL at 38  wk July 31 8 pm  [x] Mode of delivery ( anticipated ): vag

## 2025-07-17 ENCOUNTER — HOSPITAL ENCOUNTER (OUTPATIENT)
Dept: RADIOLOGY | Facility: CLINIC | Age: 30
Discharge: HOME | End: 2025-07-17
Payer: COMMERCIAL

## 2025-07-17 DIAGNOSIS — Z34.91 PRENATAL CARE IN FIRST TRIMESTER: ICD-10-CM

## 2025-07-17 DIAGNOSIS — O99.213 OBESITY COMPLICATING PREGNANCY, THIRD TRIMESTER (HHS-HCC): ICD-10-CM

## 2025-07-17 LAB — GP B STREP SPEC QL CULT: NORMAL

## 2025-07-17 PROCEDURE — 76816 OB US FOLLOW-UP PER FETUS: CPT

## 2025-07-17 PROCEDURE — 76819 FETAL BIOPHYS PROFIL W/O NST: CPT

## 2025-07-20 PROBLEM — O09.93 HIGH-RISK PREGNANCY IN THIRD TRIMESTER (HHS-HCC): Status: ACTIVE | Noted: 2025-07-20

## 2025-07-23 ENCOUNTER — APPOINTMENT (OUTPATIENT)
Facility: CLINIC | Age: 30
End: 2025-07-23
Payer: COMMERCIAL

## 2025-07-23 DIAGNOSIS — O09.93 HIGH-RISK PREGNANCY IN THIRD TRIMESTER (HHS-HCC): ICD-10-CM

## 2025-07-23 DIAGNOSIS — O10.919 HTN IN PREGNANCY, CHRONIC (HHS-HCC): Primary | ICD-10-CM

## 2025-07-23 DIAGNOSIS — E66.813 CLASS 3 SEVERE OBESITY WITH BODY MASS INDEX (BMI) OF 40.0 TO 44.9 IN ADULT: ICD-10-CM

## 2025-07-23 PROCEDURE — 0501F PRENATAL FLOW SHEET: CPT | Performed by: OBSTETRICS & GYNECOLOGY

## 2025-07-23 PROCEDURE — 59025 FETAL NON-STRESS TEST: CPT | Performed by: OBSTETRICS & GYNECOLOGY

## 2025-07-23 NOTE — PROGRESS NOTES
Doing well. No complaints. Birth plan reviewed and is what we already do at [facility] .    Declines 37 week IOL . Has 38 wk IOL. Hopes to go into labor on her own- membrane stripping today = 2 cm   NST     FHR reactive, baseline 150s , no decels, at least 2 accelerations noted.  Audible fetal mvmts.  No ctxs.       Medical Problems       Problem List       HTN in pregnancy, chronic ([facility])    Overview Addendum 7/20/2025  6:51 PM by Gaby Thayer MD   CHTN on Labetalol 100 BID   To check home BPs weekly goal is 140 /80s  Pt aware of incr risk Preeclampsia / had it last preg at 39 weeks  PLAN:  32w NST or BPP weekly , deliver at 37 w = pt wants 38 week IOL on July 31 8pm so baby can be born Aug 1  , aware of risks of PEC , to follow closely / does Home BP checks    Growth scans 76 % at  36 w    Home BPs 140s         High-risk pregnancy in third trimester ([facility])    Overview Addendum 7/20/2025  6:57 PM by Gaby Thayer MD   Discuss again w pt at next appt   High risk preg / to  recommend IOL at 37 weeks - pt last chose 38 wk    BMI 45 now : wt gain 28  lbs / EFW 7 lbs at 36weeks fetal AC > 96 %  / risk of PPH. Csection . PEC          Class 3 severe obesity with body mass index (BMI) of 40.0 to 44.9 in adult    Overview Addendum 5/29/2025  2:26 PM by Gaby Thayer MD   Healthy diet discussed - wt gain goal 20 lbs max   Follow up growth scans and Fetal ECHO = wnl          Rubella non-immune status, antepartum ([facility])    Overview Signed 1/23/2025  2:11 PM by Gaby Thayer MD   Vaccine postpartum          Single umbilical artery ([facility])    Overview Signed 4/5/2025  9:05 PM by Gaby Thayer MD   Seen at 20 weeks   Follow up growth scans          Anemia during pregnancy in third trimester    Overview Addendum 7/7/2025 10:12 AM by Gaby Thayer MD   Added iron RX daily   6/11-pt requested iron infusions x 3  done         BMI 45.0-49.9, adult ([facility])    Overview Signed 7/20/2025  6:50 PM  by Gaby Thayer MD                Gastroesophageal reflux disease    Overview Signed 12/23/2024 10:53 AM by Gaby Thayer MD   On Protonix - safe in preg         Exercise-induced asthma    Overview Signed 12/23/2024 11:11 AM by Gaby Thayer MD   Has albuterol inhaler prn          36 weeks gestation of pregnancy ([facility])    Overview Addendum 7/14/2025  9:25 AM by Gaby Thayer MD   Desired provider in labor: [] CNM  [x] Physician  [x] Blood Products: [x] Yes, accepts [] No, needs counseling  [x] Initial BMI: 41.86   [x] Prenatal Labs:   [x] Cervical Cancer Screening up to date wnl 8/2024  [x] Rh status: A pos   [x] Genetic Screening:  XX cfDNA wnl, carrier screen neg   [x] NT US: (11-13 wks)  [x] Baby ASA (if indicated):  [x] Pregnancy dated by: LMP    [x] Anatomy US: (19-20 wks)  [] Federal Sterilization consent signed (if indicated):  [x] 1hr GCT at 24-28wks: wnl  [x] Rhogam (if indicated):   No  A pos   [x] Fetal Surveillance (if indicated): weekly NST 32 wk   [x] Tdap (27-32 wks, may be given up to 36 wks if initial window missed):     [x] Breastfeeding:  [] Postpartum Birth control method:   [x] GBS at 36 - 37 wks:  [x] 39 weeks discussion of IOL vs. Expectant management: IOL at 38  wk July 31 8 pm  [] Mode of delivery ( anticipated ): vag                  Gaby Thayer MD

## 2025-07-24 ENCOUNTER — PATIENT MESSAGE (OUTPATIENT)
Facility: CLINIC | Age: 30
End: 2025-07-24
Payer: COMMERCIAL

## 2025-07-25 ENCOUNTER — HOSPITAL ENCOUNTER (OUTPATIENT)
Facility: HOSPITAL | Age: 30
Discharge: HOME | End: 2025-07-25
Attending: OBSTETRICS & GYNECOLOGY | Admitting: OBSTETRICS & GYNECOLOGY
Payer: COMMERCIAL

## 2025-07-25 VITALS
HEART RATE: 86 BPM | DIASTOLIC BLOOD PRESSURE: 58 MMHG | OXYGEN SATURATION: 96 % | HEIGHT: 64 IN | TEMPERATURE: 99 F | WEIGHT: 246.91 LBS | BODY MASS INDEX: 42.15 KG/M2 | RESPIRATION RATE: 18 BRPM | SYSTOLIC BLOOD PRESSURE: 124 MMHG

## 2025-07-25 DIAGNOSIS — Z3A.37 37 WEEKS GESTATION OF PREGNANCY (HHS-HCC): Primary | ICD-10-CM

## 2025-07-25 DIAGNOSIS — O10.919 HTN IN PREGNANCY, CHRONIC (HHS-HCC): ICD-10-CM

## 2025-07-25 LAB
ABO GROUP (TYPE) IN BLOOD: NORMAL
ALBUMIN SERPL BCP-MCNC: 3.7 G/DL (ref 3.4–5)
ALP SERPL-CCNC: 59 U/L (ref 33–110)
ALT SERPL W P-5'-P-CCNC: 12 U/L (ref 7–45)
ANION GAP SERPL CALC-SCNC: 13 MMOL/L (ref 10–20)
ANTIBODY SCREEN: NORMAL
AST SERPL W P-5'-P-CCNC: 14 U/L (ref 9–39)
BILIRUB SERPL-MCNC: 0.3 MG/DL (ref 0–1.2)
BUN SERPL-MCNC: 6 MG/DL (ref 6–23)
CALCIUM SERPL-MCNC: 9.1 MG/DL (ref 8.6–10.3)
CHLORIDE SERPL-SCNC: 104 MMOL/L (ref 98–107)
CO2 SERPL-SCNC: 23 MMOL/L (ref 21–32)
CREAT SERPL-MCNC: 0.46 MG/DL (ref 0.5–1.05)
CREAT UR-MCNC: 43.2 MG/DL (ref 20–320)
EGFRCR SERPLBLD CKD-EPI 2021: >90 ML/MIN/1.73M*2
ERYTHROCYTE [DISTWIDTH] IN BLOOD BY AUTOMATED COUNT: 14.1 % (ref 11.5–14.5)
GLUCOSE SERPL-MCNC: 79 MG/DL (ref 74–99)
HCT VFR BLD AUTO: 33.7 % (ref 36–46)
HGB BLD-MCNC: 11 G/DL (ref 12–16)
HOLD SPECIMEN: NORMAL
LDH SERPL L TO P-CCNC: 110 U/L (ref 84–246)
MCH RBC QN AUTO: 27.2 PG (ref 26–34)
MCHC RBC AUTO-ENTMCNC: 32.6 G/DL (ref 32–36)
MCV RBC AUTO: 83 FL (ref 80–100)
NRBC BLD-RTO: 0 /100 WBCS (ref 0–0)
PLATELET # BLD AUTO: 293 X10*3/UL (ref 150–450)
POTASSIUM SERPL-SCNC: 3.9 MMOL/L (ref 3.5–5.3)
PROT SERPL-MCNC: 6.7 G/DL (ref 6.4–8.2)
PROT UR-ACNC: 5 MG/DL (ref 5–24)
PROT/CREAT UR: 0.12 MG/MG CREAT (ref 0–0.17)
RBC # BLD AUTO: 4.04 X10*6/UL (ref 4–5.2)
RH FACTOR (ANTIGEN D): NORMAL
SODIUM SERPL-SCNC: 136 MMOL/L (ref 136–145)
URATE SERPL-MCNC: 4.8 MG/DL (ref 2.3–6.7)
WBC # BLD AUTO: 12 X10*3/UL (ref 4.4–11.3)

## 2025-07-25 PROCEDURE — 80053 COMPREHEN METABOLIC PANEL: CPT | Performed by: OBSTETRICS & GYNECOLOGY

## 2025-07-25 PROCEDURE — 99212 OFFICE O/P EST SF 10 MIN: CPT | Performed by: OBSTETRICS & GYNECOLOGY

## 2025-07-25 PROCEDURE — 86901 BLOOD TYPING SEROLOGIC RH(D): CPT | Performed by: OBSTETRICS & GYNECOLOGY

## 2025-07-25 PROCEDURE — 83615 LACTATE (LD) (LDH) ENZYME: CPT | Performed by: OBSTETRICS & GYNECOLOGY

## 2025-07-25 PROCEDURE — 36415 COLL VENOUS BLD VENIPUNCTURE: CPT | Performed by: OBSTETRICS & GYNECOLOGY

## 2025-07-25 PROCEDURE — 84550 ASSAY OF BLOOD/URIC ACID: CPT | Performed by: OBSTETRICS & GYNECOLOGY

## 2025-07-25 PROCEDURE — 82570 ASSAY OF URINE CREATININE: CPT | Performed by: OBSTETRICS & GYNECOLOGY

## 2025-07-25 PROCEDURE — 99214 OFFICE O/P EST MOD 30 MIN: CPT | Mod: 25

## 2025-07-25 PROCEDURE — 59025 FETAL NON-STRESS TEST: CPT | Performed by: OBSTETRICS & GYNECOLOGY

## 2025-07-25 PROCEDURE — 85027 COMPLETE CBC AUTOMATED: CPT | Performed by: OBSTETRICS & GYNECOLOGY

## 2025-07-25 PROCEDURE — 36415 COLL VENOUS BLD VENIPUNCTURE: CPT

## 2025-07-25 RX ORDER — ONDANSETRON HYDROCHLORIDE 2 MG/ML
4 INJECTION, SOLUTION INTRAVENOUS EVERY 6 HOURS PRN
Status: DISCONTINUED | OUTPATIENT
Start: 2025-07-25 | End: 2025-07-25 | Stop reason: HOSPADM

## 2025-07-25 RX ORDER — LIDOCAINE HYDROCHLORIDE 10 MG/ML
0.5 INJECTION, SOLUTION EPIDURAL; INFILTRATION; INTRACAUDAL; PERINEURAL ONCE AS NEEDED
Status: DISCONTINUED | OUTPATIENT
Start: 2025-07-25 | End: 2025-07-25 | Stop reason: HOSPADM

## 2025-07-25 RX ORDER — LABETALOL HYDROCHLORIDE 5 MG/ML
20 INJECTION, SOLUTION INTRAVENOUS ONCE AS NEEDED
Status: DISCONTINUED | OUTPATIENT
Start: 2025-07-25 | End: 2025-07-25 | Stop reason: HOSPADM

## 2025-07-25 RX ORDER — LABETALOL 100 MG/1
50 TABLET, FILM COATED ORAL 2 TIMES DAILY
Start: 2025-07-25 | End: 2025-08-03 | Stop reason: HOSPADM

## 2025-07-25 RX ORDER — HYDRALAZINE HYDROCHLORIDE 20 MG/ML
5 INJECTION INTRAMUSCULAR; INTRAVENOUS ONCE AS NEEDED
Status: DISCONTINUED | OUTPATIENT
Start: 2025-07-25 | End: 2025-07-25 | Stop reason: HOSPADM

## 2025-07-25 RX ORDER — ONDANSETRON 4 MG/1
4 TABLET, FILM COATED ORAL EVERY 6 HOURS PRN
Status: DISCONTINUED | OUTPATIENT
Start: 2025-07-25 | End: 2025-07-25 | Stop reason: HOSPADM

## 2025-07-25 ASSESSMENT — PAIN SCALES - GENERAL: PAINLEVEL_OUTOF10: 0 - NO PAIN

## 2025-07-25 NOTE — TELEPHONE ENCOUNTER
Call and spoke with patient per Dr. Sarmiento advised to go to [facility] triage for further evaluation. Patient verbalized understanding will follow up as needed.

## 2025-07-25 NOTE — H&P
"Obstetrical Triage Note    Reason for Triage Observation: Hypertension (Has been taking Bp at & had a elevated BP.  Sent a message to Dr. Thayer & recommended she come to be evaluated)  Pt has been taking her BP at home and she had one BP that was in the 150's.  All of her other BP's have been in normal range for her(110-140's).  She is still taking her labetalol 50mg BID.  No PIH symptoms.    Assessment   hypertension  Triage Testing revealed normal PIH labs, one mild range and the rest normal BP's     Plan   Discharge home.     Subjective   History of Present Pregnancy  Karine Ruiz is a 29 y.o.  gravid, female. Patient's last menstrual period was 2024. with an Estimated Date of Delivery of 2025, by Last Menstrual Period, who is now 37w3d gestation. The patient's blood type is A POS.   Triage Course:  NST reactive, PIH labs, serial BP's    Objective   Recent Vital Signs:                                 /58   Pulse 86   Temp 37.2 °C (99 °F) (Oral)   Resp 18   Ht 1.626 m (5' 4\")   Wt 112 kg (246 lb 14.6 oz)   BMI 42.38 kg/m²     BP & Temp Min/Max Last 24 Hours:     BP  Min: 124/58   Min taken time: 25 1450  Max: 144/64   Max taken time: 25 1355  Temp  Av.2 °C (99 °F)  Min: 37.2 °C (99 °F)   Min taken time: 25 1352  Max: 37.2 °C (99 °F)   Max taken time: 25 1352    Physical Examination:  GENERAL: Examination reveals a well developed, well nourished, gravid female in no acute distress. She is alert and cooperative.  FHR is 130 , with mod katerin , and a cat 1  tracing.    [place] reading:  none  EXTREMITIES: no redness or tenderness in the calves or thighs, no edema  SKIN: normal coloration and turgor, no rashes  NEUROLOGICAL: DTRs normal and symmetrical  PSYCHOLOGICAL: awake and alert; oriented to person, place, and time    Lab Review:   Lab Results   Component Value Date    WBC 12.0 (H) 2025    HGB 11.0 (L) 2025    HCT 33.7 (L) 2025    "  07/25/2025     Lab Results   Component Value Date     07/25/2025    K 3.9 07/25/2025     07/25/2025    CO2 23 07/25/2025    BUN 6 07/25/2025    CREATININE 0.46 (L) 07/25/2025     Lab Results   Component Value Date    UTPCR 0.12 07/25/2025    URICACID 4.8 07/25/2025

## 2025-07-28 LAB — HOLD SPECIMEN: NORMAL

## 2025-07-29 ENCOUNTER — APPOINTMENT (OUTPATIENT)
Facility: CLINIC | Age: 30
End: 2025-07-29
Payer: COMMERCIAL

## 2025-07-29 VITALS — SYSTOLIC BLOOD PRESSURE: 125 MMHG | BODY MASS INDEX: 46.48 KG/M2 | DIASTOLIC BLOOD PRESSURE: 82 MMHG | WEIGHT: 270.8 LBS

## 2025-07-29 DIAGNOSIS — O10.919 HTN IN PREGNANCY, CHRONIC (HHS-HCC): ICD-10-CM

## 2025-07-29 DIAGNOSIS — O09.899 RUBELLA NON-IMMUNE STATUS, ANTEPARTUM (HHS-HCC): ICD-10-CM

## 2025-07-29 DIAGNOSIS — Q27.0 SINGLE UMBILICAL ARTERY (HHS-HCC): ICD-10-CM

## 2025-07-29 DIAGNOSIS — O09.93 HIGH-RISK PREGNANCY IN THIRD TRIMESTER (HHS-HCC): ICD-10-CM

## 2025-07-29 DIAGNOSIS — O99.013 ANEMIA DURING PREGNANCY IN THIRD TRIMESTER: ICD-10-CM

## 2025-07-29 DIAGNOSIS — Z3A.36 36 WEEKS GESTATION OF PREGNANCY (HHS-HCC): ICD-10-CM

## 2025-07-29 DIAGNOSIS — Z28.39 RUBELLA NON-IMMUNE STATUS, ANTEPARTUM (HHS-HCC): ICD-10-CM

## 2025-07-29 DIAGNOSIS — E66.813 CLASS 3 SEVERE OBESITY WITH BODY MASS INDEX (BMI) OF 40.0 TO 44.9 IN ADULT: ICD-10-CM

## 2025-07-29 PROCEDURE — 59025 FETAL NON-STRESS TEST: CPT | Performed by: OBSTETRICS & GYNECOLOGY

## 2025-07-29 PROCEDURE — 0501F PRENATAL FLOW SHEET: CPT | Performed by: OBSTETRICS & GYNECOLOGY

## 2025-07-29 NOTE — PROGRESS NOTES
NST     FHR reactive, baseline 150s , no decels, at least 2 accelerations noted.  Audible fetal mvmts.  No ctxs.       Doing well. No complaints.   Medical Problems       Problem List       HTN in pregnancy, chronic ([facility])    Overview Addendum 7/20/2025  6:51 PM by Gaby Thayer MD   CHTN on Labetalol 100 BID   To check home BPs weekly goal is 140 /80s  Pt aware of incr risk Preeclampsia / had it last preg at 39 weeks  PLAN:  32w NST or BPP weekly , deliver at 37 w = pt wants 38 week IOL on July 31 8pm so baby can be born Aug 1  , aware of risks of PEC , to follow closely / does Home BP checks    Growth scans 76 % at  36 w    Home BPs 140s         High-risk pregnancy in third trimester ([facility])    Overview Addendum 7/20/2025  6:57 PM by Gaby Thayer MD   Discuss again w pt at next appt   High risk preg / to  recommend IOL at 37 weeks - pt last chose 38 wk    BMI 45 now : wt gain 28  lbs / EFW 7 lbs at 36weeks fetal AC > 96 %  / risk of PPH. Csection . PEC          Class 3 severe obesity with body mass index (BMI) of 40.0 to 44.9 in adult    Overview Addendum 5/29/2025  2:26 PM by Gaby Thayer MD   Healthy diet discussed - wt gain goal 20 lbs max   Follow up growth scans and Fetal ECHO = wnl          Rubella non-immune status, antepartum ([facility])    Overview Signed 1/23/2025  2:11 PM by Gaby Thayer MD   Vaccine postpartum          Single umbilical artery ([facility])    Overview Signed 4/5/2025  9:05 PM by Gaby Thayer MD   Seen at 20 weeks   Follow up growth scans          Anemia during pregnancy in third trimester    Overview Addendum 7/7/2025 10:12 AM by Gaby Thayer MD   Added iron RX daily   6/11-pt requested iron infusions x 3  done                 Gastroesophageal reflux disease    Overview Signed 12/23/2024 10:53 AM by Gaby Thayer MD   On Protonix - safe in preg         Exercise-induced asthma    Overview Signed 12/23/2024 11:11 AM by Gaby Thayer  MD   Has albuterol inhaler prn          38 weeks gestation of pregnancy ([facility])    Overview Addendum 7/14/2025  9:25 AM by Gaby Thayer MD   Desired provider in labor: [] CNM  [x] Physician  [x] Blood Products: [x] Yes, accepts [] No, needs counseling  [x] Initial BMI: 41.86   [x] Prenatal Labs:   [x] Cervical Cancer Screening up to date wnl 8/2024  [x] Rh status: A pos   [x] Genetic Screening:  XX cfDNA wnl, carrier screen neg   [x] NT US: (11-13 wks)  [x] Baby ASA (if indicated):  [x] Pregnancy dated by: LMP    [x] Anatomy US: (19-20 wks)  [x] Federal Sterilization consent signed (if indicated): NA   [x] 1hr GCT at 24-28wks: wnl  [x] Rhogam (if indicated):   No  A pos   [x] Fetal Surveillance (if indicated): weekly NST 32 wk   [x] Tdap (27-32 wks, may be given up to 36 wks if initial window missed):     [x] Breastfeeding:  [] Postpartum Birth control method:   [x] GBS at 36 - 37 wks:  [x] 39 weeks discussion of IOL vs. Expectant management: IOL at 38  wk July 31 8 pm  [x] Mode of delivery ( anticipated ): vag             Ready for IOL this week , declines earlier day.    VE 3/50/-2     Gaby Thayer MD

## 2025-07-31 ENCOUNTER — APPOINTMENT (OUTPATIENT)
Dept: OBSTETRICS AND GYNECOLOGY | Facility: HOSPITAL | Age: 30
End: 2025-07-31
Payer: COMMERCIAL

## 2025-07-31 ENCOUNTER — HOSPITAL ENCOUNTER (INPATIENT)
Facility: HOSPITAL | Age: 30
LOS: 3 days | Discharge: HOME | End: 2025-08-03
Attending: OBSTETRICS & GYNECOLOGY | Admitting: OBSTETRICS & GYNECOLOGY
Payer: COMMERCIAL

## 2025-07-31 DIAGNOSIS — R52 POSTPARTUM PAIN (HHS-HCC): Primary | ICD-10-CM

## 2025-07-31 DIAGNOSIS — O10.919 HTN IN PREGNANCY, CHRONIC (HHS-HCC): ICD-10-CM

## 2025-07-31 DIAGNOSIS — O10.919 CHRONIC HYPERTENSION AFFECTING PREGNANCY (HHS-HCC): ICD-10-CM

## 2025-07-31 LAB
ABO GROUP (TYPE) IN BLOOD: NORMAL
ANTIBODY SCREEN: NORMAL
ERYTHROCYTE [DISTWIDTH] IN BLOOD BY AUTOMATED COUNT: 14.1 % (ref 11.5–14.5)
HCT VFR BLD AUTO: 32.3 % (ref 36–46)
HGB BLD-MCNC: 11 G/DL (ref 12–16)
MCH RBC QN AUTO: 28.3 PG (ref 26–34)
MCHC RBC AUTO-ENTMCNC: 34.1 G/DL (ref 32–36)
MCV RBC AUTO: 83 FL (ref 80–100)
NRBC BLD-RTO: 0 /100 WBCS (ref 0–0)
PLATELET # BLD AUTO: 327 X10*3/UL (ref 150–450)
RBC # BLD AUTO: 3.89 X10*6/UL (ref 4–5.2)
RH FACTOR (ANTIGEN D): NORMAL
WBC # BLD AUTO: 12.9 X10*3/UL (ref 4.4–11.3)

## 2025-07-31 PROCEDURE — 85027 COMPLETE CBC AUTOMATED: CPT | Performed by: OBSTETRICS & GYNECOLOGY

## 2025-07-31 PROCEDURE — 2500000001 HC RX 250 WO HCPCS SELF ADMINISTERED DRUGS (ALT 637 FOR MEDICARE OP): Performed by: OBSTETRICS & GYNECOLOGY

## 2025-07-31 PROCEDURE — 86901 BLOOD TYPING SEROLOGIC RH(D): CPT | Performed by: OBSTETRICS & GYNECOLOGY

## 2025-07-31 PROCEDURE — 7210000002 HC LABOR PER HOUR

## 2025-07-31 PROCEDURE — 86780 TREPONEMA PALLIDUM: CPT | Mod: STJLAB | Performed by: OBSTETRICS & GYNECOLOGY

## 2025-07-31 PROCEDURE — 59050 FETAL MONITOR W/REPORT: CPT

## 2025-07-31 PROCEDURE — 84075 ASSAY ALKALINE PHOSPHATASE: CPT | Performed by: OBSTETRICS & GYNECOLOGY

## 2025-07-31 PROCEDURE — 1120000001 HC OB PRIVATE ROOM DAILY

## 2025-07-31 PROCEDURE — 36415 COLL VENOUS BLD VENIPUNCTURE: CPT | Performed by: OBSTETRICS & GYNECOLOGY

## 2025-07-31 RX ORDER — LOPERAMIDE HYDROCHLORIDE 2 MG/1
4 CAPSULE ORAL EVERY 2 HOUR PRN
Status: DISCONTINUED | OUTPATIENT
Start: 2025-07-31 | End: 2025-08-01

## 2025-07-31 RX ORDER — ONDANSETRON 4 MG/1
4 TABLET, FILM COATED ORAL EVERY 6 HOURS PRN
Status: DISCONTINUED | OUTPATIENT
Start: 2025-07-31 | End: 2025-08-01

## 2025-07-31 RX ORDER — ALBUTEROL SULFATE 90 UG/1
2 INHALANT RESPIRATORY (INHALATION) EVERY 6 HOURS PRN
Status: DISCONTINUED | OUTPATIENT
Start: 2025-07-31 | End: 2025-08-01

## 2025-07-31 RX ORDER — CARBOPROST TROMETHAMINE 250 UG/ML
250 INJECTION, SOLUTION INTRAMUSCULAR ONCE AS NEEDED
Status: DISCONTINUED | OUTPATIENT
Start: 2025-07-31 | End: 2025-08-01

## 2025-07-31 RX ORDER — LABETALOL 100 MG/1
50 TABLET, FILM COATED ORAL 2 TIMES DAILY
Status: DISCONTINUED | OUTPATIENT
Start: 2025-07-31 | End: 2025-07-31

## 2025-07-31 RX ORDER — CETIRIZINE HYDROCHLORIDE 10 MG/1
10 TABLET ORAL 2 TIMES DAILY
Status: DISCONTINUED | OUTPATIENT
Start: 2025-07-31 | End: 2025-08-01

## 2025-07-31 RX ORDER — TERBUTALINE SULFATE 1 MG/ML
0.25 INJECTION SUBCUTANEOUS ONCE AS NEEDED
Status: DISCONTINUED | OUTPATIENT
Start: 2025-07-31 | End: 2025-08-01

## 2025-07-31 RX ORDER — TRANEXAMIC ACID 1 G/10ML
1000 INJECTION, SOLUTION INTRAVENOUS ONCE AS NEEDED
Status: DISCONTINUED | OUTPATIENT
Start: 2025-07-31 | End: 2025-08-01

## 2025-07-31 RX ORDER — FENTANYL/ROPIVACAINE/NS/PF 2MCG/ML-.2
0-25 PLASTIC BAG, INJECTION (ML) INJECTION CONTINUOUS
Refills: 0 | OUTPATIENT
Start: 2025-07-31

## 2025-07-31 RX ORDER — OXYTOCIN 10 [USP'U]/ML
10 INJECTION, SOLUTION INTRAMUSCULAR; INTRAVENOUS ONCE AS NEEDED
Status: DISCONTINUED | OUTPATIENT
Start: 2025-07-31 | End: 2025-08-01

## 2025-07-31 RX ORDER — SODIUM CHLORIDE, SODIUM LACTATE, POTASSIUM CHLORIDE, CALCIUM CHLORIDE 600; 310; 30; 20 MG/100ML; MG/100ML; MG/100ML; MG/100ML
75 INJECTION, SOLUTION INTRAVENOUS CONTINUOUS
Status: DISCONTINUED | OUTPATIENT
Start: 2025-07-31 | End: 2025-08-01

## 2025-07-31 RX ORDER — ONDANSETRON HYDROCHLORIDE 2 MG/ML
4 INJECTION, SOLUTION INTRAVENOUS EVERY 6 HOURS PRN
Status: DISCONTINUED | OUTPATIENT
Start: 2025-07-31 | End: 2025-08-01

## 2025-07-31 RX ORDER — LIDOCAINE HYDROCHLORIDE 10 MG/ML
20 INJECTION, SOLUTION INFILTRATION; PERINEURAL ONCE AS NEEDED
Status: DISCONTINUED | OUTPATIENT
Start: 2025-07-31 | End: 2025-08-01

## 2025-07-31 RX ORDER — CALCIUM CARBONATE 200(500)MG
1 TABLET,CHEWABLE ORAL EVERY 6 HOURS PRN
Status: DISCONTINUED | OUTPATIENT
Start: 2025-07-31 | End: 2025-08-01

## 2025-07-31 RX ORDER — HYDRALAZINE HYDROCHLORIDE 20 MG/ML
5 INJECTION INTRAMUSCULAR; INTRAVENOUS ONCE AS NEEDED
Status: DISCONTINUED | OUTPATIENT
Start: 2025-07-31 | End: 2025-08-01

## 2025-07-31 RX ORDER — OXYTOCIN/0.9 % SODIUM CHLORIDE 30/500 ML
60 PLASTIC BAG, INJECTION (ML) INTRAVENOUS ONCE AS NEEDED
Status: DISCONTINUED | OUTPATIENT
Start: 2025-07-31 | End: 2025-08-01

## 2025-07-31 RX ORDER — METHYLERGONOVINE MALEATE 0.2 MG/ML
0.2 INJECTION INTRAVENOUS ONCE AS NEEDED
Status: DISCONTINUED | OUTPATIENT
Start: 2025-07-31 | End: 2025-08-01

## 2025-07-31 RX ORDER — LABETALOL 100 MG/1
100 TABLET, FILM COATED ORAL 2 TIMES DAILY
Status: DISCONTINUED | OUTPATIENT
Start: 2025-07-31 | End: 2025-08-03 | Stop reason: HOSPADM

## 2025-07-31 RX ORDER — MISOPROSTOL 200 UG/1
800 TABLET ORAL ONCE AS NEEDED
Status: DISCONTINUED | OUTPATIENT
Start: 2025-07-31 | End: 2025-08-01

## 2025-07-31 RX ORDER — PANTOPRAZOLE SODIUM 20 MG/1
20 TABLET, DELAYED RELEASE ORAL
Status: DISCONTINUED | OUTPATIENT
Start: 2025-08-01 | End: 2025-08-01

## 2025-07-31 RX ORDER — LABETALOL HYDROCHLORIDE 5 MG/ML
20 INJECTION, SOLUTION INTRAVENOUS ONCE AS NEEDED
Status: DISCONTINUED | OUTPATIENT
Start: 2025-07-31 | End: 2025-08-01

## 2025-07-31 RX ADMIN — MISOPROSTOL 25 MCG: 100 TABLET ORAL at 23:15

## 2025-07-31 RX ADMIN — MISOPROSTOL 25 MCG: 100 TABLET ORAL at 21:14

## 2025-07-31 SDOH — SOCIAL STABILITY: SOCIAL INSECURITY
WITHIN THE LAST YEAR, HAVE YOU BEEN KICKED, HIT, SLAPPED, OR OTHERWISE PHYSICALLY HURT BY YOUR PARTNER OR EX-PARTNER?: NO

## 2025-07-31 SDOH — HEALTH STABILITY: MENTAL HEALTH: NON-SPECIFIC ACTIVE SUICIDAL THOUGHTS (PAST 1 MONTH): NO

## 2025-07-31 SDOH — HEALTH STABILITY: MENTAL HEALTH: WISH TO BE DEAD (PAST 1 MONTH): NO

## 2025-07-31 SDOH — SOCIAL STABILITY: SOCIAL INSECURITY: WITHIN THE LAST YEAR, HAVE YOU BEEN HUMILIATED OR EMOTIONALLY ABUSED IN OTHER WAYS BY YOUR PARTNER OR EX-PARTNER?: NO

## 2025-07-31 SDOH — ECONOMIC STABILITY: FOOD INSECURITY: HOW HARD IS IT FOR YOU TO PAY FOR THE VERY BASICS LIKE FOOD, HOUSING, MEDICAL CARE, AND HEATING?: NOT HARD AT ALL

## 2025-07-31 SDOH — SOCIAL STABILITY: SOCIAL INSECURITY: ARE YOU OR HAVE YOU BEEN THREATENED OR ABUSED PHYSICALLY, EMOTIONALLY, OR SEXUALLY BY ANYONE?: NO

## 2025-07-31 SDOH — ECONOMIC STABILITY: FOOD INSECURITY: WITHIN THE PAST 12 MONTHS, YOU WORRIED THAT YOUR FOOD WOULD RUN OUT BEFORE YOU GOT THE MONEY TO BUY MORE.: NEVER TRUE

## 2025-07-31 SDOH — HEALTH STABILITY: MENTAL HEALTH: SUICIDAL BEHAVIOR (LIFETIME): NO

## 2025-07-31 SDOH — SOCIAL STABILITY: SOCIAL INSECURITY: WITHIN THE LAST YEAR, HAVE YOU BEEN AFRAID OF YOUR PARTNER OR EX-PARTNER?: NO

## 2025-07-31 SDOH — SOCIAL STABILITY: SOCIAL INSECURITY
WITHIN THE LAST YEAR, HAVE YOU BEEN RAPED OR FORCED TO HAVE ANY KIND OF SEXUAL ACTIVITY BY YOUR PARTNER OR EX-PARTNER?: NO

## 2025-07-31 SDOH — ECONOMIC STABILITY: FOOD INSECURITY: WITHIN THE PAST 12 MONTHS, THE FOOD YOU BOUGHT JUST DIDN'T LAST AND YOU DIDN'T HAVE MONEY TO GET MORE.: NEVER TRUE

## 2025-07-31 SDOH — SOCIAL STABILITY: SOCIAL INSECURITY: HAS ANYONE EVER THREATENED TO HURT YOUR FAMILY OR YOUR PETS?: NO

## 2025-07-31 SDOH — ECONOMIC STABILITY: TRANSPORTATION INSECURITY: IN THE PAST 12 MONTHS, HAS LACK OF TRANSPORTATION KEPT YOU FROM MEDICAL APPOINTMENTS OR FROM GETTING MEDICATIONS?: NO

## 2025-07-31 SDOH — SOCIAL STABILITY: SOCIAL INSECURITY: DO YOU FEEL ANYONE HAS EXPLOITED OR TAKEN ADVANTAGE OF YOU FINANCIALLY OR OF YOUR PERSONAL PROPERTY?: NO

## 2025-07-31 SDOH — SOCIAL STABILITY: SOCIAL INSECURITY: ABUSE SCREEN: ADULT

## 2025-07-31 SDOH — SOCIAL STABILITY: SOCIAL INSECURITY: HAVE YOU HAD ANY THOUGHTS OF HARMING ANYONE ELSE?: NO

## 2025-07-31 SDOH — HEALTH STABILITY: MENTAL HEALTH: HAVE YOU USED ANY SUBSTANCES (CANABIS, COCAINE, HEROIN, HALLUCINOGENS, INHALANTS, ETC.) IN THE PAST 12 MONTHS?: NO

## 2025-07-31 SDOH — HEALTH STABILITY: MENTAL HEALTH: HAVE YOU USED ANY PRESCRIPTION DRUGS OTHER THAN PRESCRIBED IN THE PAST 12 MONTHS?: NO

## 2025-07-31 SDOH — ECONOMIC STABILITY: HOUSING INSECURITY: DO YOU FEEL UNSAFE GOING BACK TO THE PLACE WHERE YOU ARE LIVING?: NO

## 2025-07-31 SDOH — HEALTH STABILITY: MENTAL HEALTH: WERE YOU ABLE TO COMPLETE ALL THE BEHAVIORAL HEALTH SCREENINGS?: YES

## 2025-07-31 SDOH — SOCIAL STABILITY: SOCIAL INSECURITY: VERBAL ABUSE: DENIES

## 2025-07-31 SDOH — SOCIAL STABILITY: SOCIAL INSECURITY: ARE THERE ANY APPARENT SIGNS OF INJURIES/BEHAVIORS THAT COULD BE RELATED TO ABUSE/NEGLECT?: NO

## 2025-07-31 SDOH — SOCIAL STABILITY: SOCIAL INSECURITY: HAVE YOU HAD THOUGHTS OF HARMING ANYONE ELSE?: NO

## 2025-07-31 SDOH — SOCIAL STABILITY: SOCIAL INSECURITY: PHYSICAL ABUSE: DENIES

## 2025-07-31 SDOH — SOCIAL STABILITY: SOCIAL INSECURITY: DOES ANYONE TRY TO KEEP YOU FROM HAVING/CONTACTING OTHER FRIENDS OR DOING THINGS OUTSIDE YOUR HOME?: NO

## 2025-07-31 ASSESSMENT — PATIENT HEALTH QUESTIONNAIRE - PHQ9
SUM OF ALL RESPONSES TO PHQ9 QUESTIONS 1 & 2: 0
2. FEELING DOWN, DEPRESSED OR HOPELESS: NOT AT ALL
1. LITTLE INTEREST OR PLEASURE IN DOING THINGS: NOT AT ALL

## 2025-07-31 ASSESSMENT — LIFESTYLE VARIABLES
HOW OFTEN DO YOU HAVE 6 OR MORE DRINKS ON ONE OCCASION: NEVER
SKIP TO QUESTIONS 9-10: 1
AUDIT-C TOTAL SCORE: 0
AUDIT-C TOTAL SCORE: 0
HOW MANY STANDARD DRINKS CONTAINING ALCOHOL DO YOU HAVE ON A TYPICAL DAY: PATIENT DOES NOT DRINK
HOW OFTEN DO YOU HAVE A DRINK CONTAINING ALCOHOL: NEVER

## 2025-07-31 ASSESSMENT — ACTIVITIES OF DAILY LIVING (ADL)
GROOMING: INDEPENDENT
HEARING - RIGHT EAR: FUNCTIONAL
DRESSING YOURSELF: INDEPENDENT
LACK_OF_TRANSPORTATION: NO
PATIENT'S MEMORY ADEQUATE TO SAFELY COMPLETE DAILY ACTIVITIES?: YES
HEARING - LEFT EAR: FUNCTIONAL
TOILETING: INDEPENDENT
FEEDING YOURSELF: INDEPENDENT
WALKS IN HOME: INDEPENDENT
BATHING: INDEPENDENT
ADEQUATE_TO_COMPLETE_ADL: YES
LACK_OF_TRANSPORTATION: NO
JUDGMENT_ADEQUATE_SAFELY_COMPLETE_DAILY_ACTIVITIES: YES

## 2025-07-31 ASSESSMENT — PAIN SCALES - GENERAL
PAINLEVEL_OUTOF10: 0 - NO PAIN
PAINLEVEL_OUTOF10: 0 - NO PAIN

## 2025-08-01 ENCOUNTER — ANESTHESIA EVENT (OUTPATIENT)
Dept: OBSTETRICS AND GYNECOLOGY | Facility: HOSPITAL | Age: 30
End: 2025-08-01
Payer: COMMERCIAL

## 2025-08-01 ENCOUNTER — ANESTHESIA (OUTPATIENT)
Dept: OBSTETRICS AND GYNECOLOGY | Facility: HOSPITAL | Age: 30
End: 2025-08-01
Payer: COMMERCIAL

## 2025-08-01 LAB
ALBUMIN SERPL BCP-MCNC: 3.7 G/DL (ref 3.4–5)
ALP SERPL-CCNC: 67 U/L (ref 33–110)
ALT SERPL W P-5'-P-CCNC: 13 U/L (ref 7–45)
ANION GAP SERPL CALC-SCNC: 14 MMOL/L (ref 10–20)
AST SERPL W P-5'-P-CCNC: 12 U/L (ref 9–39)
BILIRUB SERPL-MCNC: 0.3 MG/DL (ref 0–1.2)
BUN SERPL-MCNC: 10 MG/DL (ref 6–23)
CALCIUM SERPL-MCNC: 9 MG/DL (ref 8.6–10.3)
CHLORIDE SERPL-SCNC: 104 MMOL/L (ref 98–107)
CO2 SERPL-SCNC: 21 MMOL/L (ref 21–32)
CREAT SERPL-MCNC: 0.42 MG/DL (ref 0.5–1.05)
CREAT UR-MCNC: 104.1 MG/DL (ref 20–320)
EGFRCR SERPLBLD CKD-EPI 2021: >90 ML/MIN/1.73M*2
GLUCOSE SERPL-MCNC: 96 MG/DL (ref 74–99)
HOLD SPECIMEN: NORMAL
POTASSIUM SERPL-SCNC: 3.8 MMOL/L (ref 3.5–5.3)
PROT SERPL-MCNC: 6.6 G/DL (ref 6.4–8.2)
PROT UR-ACNC: 13 MG/DL (ref 5–24)
PROT/CREAT UR: 0.12 MG/MG CREAT (ref 0–0.17)
SODIUM SERPL-SCNC: 135 MMOL/L (ref 136–145)
TREPONEMA PALLIDUM IGG+IGM AB [PRESENCE] IN SERUM OR PLASMA BY IMMUNOASSAY: NONREACTIVE

## 2025-08-01 PROCEDURE — 7210000002 HC LABOR PER HOUR

## 2025-08-01 PROCEDURE — 59409 OBSTETRICAL CARE: CPT | Performed by: OBSTETRICS & GYNECOLOGY

## 2025-08-01 PROCEDURE — 2500000001 HC RX 250 WO HCPCS SELF ADMINISTERED DRUGS (ALT 637 FOR MEDICARE OP): Performed by: OBSTETRICS & GYNECOLOGY

## 2025-08-01 PROCEDURE — 2500000002 HC RX 250 W HCPCS SELF ADMINISTERED DRUGS (ALT 637 FOR MEDICARE OP, ALT 636 FOR OP/ED): Performed by: OBSTETRICS & GYNECOLOGY

## 2025-08-01 PROCEDURE — 3E0DXGC INTRODUCTION OF OTHER THERAPEUTIC SUBSTANCE INTO MOUTH AND PHARYNX, EXTERNAL APPROACH: ICD-10-PCS | Performed by: OBSTETRICS & GYNECOLOGY

## 2025-08-01 PROCEDURE — 1220000001 HC OB SEMI-PRIVATE ROOM DAILY

## 2025-08-01 PROCEDURE — 2500000004 HC RX 250 GENERAL PHARMACY W/ HCPCS (ALT 636 FOR OP/ED): Performed by: OBSTETRICS & GYNECOLOGY

## 2025-08-01 PROCEDURE — 3E033VJ INTRODUCTION OF OTHER HORMONE INTO PERIPHERAL VEIN, PERCUTANEOUS APPROACH: ICD-10-PCS | Performed by: OBSTETRICS & GYNECOLOGY

## 2025-08-01 PROCEDURE — 7100000016 HC LABOR RECOVERY PER HOUR

## 2025-08-01 PROCEDURE — 10907ZC DRAINAGE OF AMNIOTIC FLUID, THERAPEUTIC FROM PRODUCTS OF CONCEPTION, VIA NATURAL OR ARTIFICIAL OPENING: ICD-10-PCS | Performed by: OBSTETRICS & GYNECOLOGY

## 2025-08-01 PROCEDURE — 82570 ASSAY OF URINE CREATININE: CPT | Performed by: OBSTETRICS & GYNECOLOGY

## 2025-08-01 PROCEDURE — 59400 OBSTETRICAL CARE: CPT | Performed by: OBSTETRICS & GYNECOLOGY

## 2025-08-01 PROCEDURE — 88307 TISSUE EXAM BY PATHOLOGIST: CPT | Mod: TC,STJLAB | Performed by: OBSTETRICS & GYNECOLOGY

## 2025-08-01 RX ORDER — CARBOPROST TROMETHAMINE 250 UG/ML
250 INJECTION, SOLUTION INTRAMUSCULAR ONCE AS NEEDED
Status: DISCONTINUED | OUTPATIENT
Start: 2025-08-01 | End: 2025-08-03 | Stop reason: HOSPADM

## 2025-08-01 RX ORDER — POLYETHYLENE GLYCOL 3350 17 G/17G
17 POWDER, FOR SOLUTION ORAL 2 TIMES DAILY PRN
Status: DISCONTINUED | OUTPATIENT
Start: 2025-08-01 | End: 2025-08-03 | Stop reason: HOSPADM

## 2025-08-01 RX ORDER — LOPERAMIDE HYDROCHLORIDE 2 MG/1
4 CAPSULE ORAL EVERY 2 HOUR PRN
Status: DISCONTINUED | OUTPATIENT
Start: 2025-08-01 | End: 2025-08-03 | Stop reason: HOSPADM

## 2025-08-01 RX ORDER — DIPHENHYDRAMINE HCL 25 MG
25 TABLET ORAL EVERY 6 HOURS PRN
Status: DISCONTINUED | OUTPATIENT
Start: 2025-08-01 | End: 2025-08-03 | Stop reason: HOSPADM

## 2025-08-01 RX ORDER — IBUPROFEN 600 MG/1
600 TABLET, FILM COATED ORAL EVERY 6 HOURS
Status: DISCONTINUED | OUTPATIENT
Start: 2025-08-01 | End: 2025-08-03 | Stop reason: HOSPADM

## 2025-08-01 RX ORDER — ONDANSETRON HYDROCHLORIDE 2 MG/ML
4 INJECTION, SOLUTION INTRAVENOUS EVERY 6 HOURS PRN
Status: DISCONTINUED | OUTPATIENT
Start: 2025-08-01 | End: 2025-08-03 | Stop reason: HOSPADM

## 2025-08-01 RX ORDER — ACETAMINOPHEN 325 MG/1
975 TABLET ORAL EVERY 6 HOURS
Status: DISCONTINUED | OUTPATIENT
Start: 2025-08-01 | End: 2025-08-03 | Stop reason: HOSPADM

## 2025-08-01 RX ORDER — SIMETHICONE 80 MG
80 TABLET,CHEWABLE ORAL 4 TIMES DAILY PRN
Status: DISCONTINUED | OUTPATIENT
Start: 2025-08-01 | End: 2025-08-03 | Stop reason: HOSPADM

## 2025-08-01 RX ORDER — DIPHENHYDRAMINE HYDROCHLORIDE 50 MG/ML
25 INJECTION, SOLUTION INTRAMUSCULAR; INTRAVENOUS EVERY 6 HOURS PRN
Status: DISCONTINUED | OUTPATIENT
Start: 2025-08-01 | End: 2025-08-03 | Stop reason: HOSPADM

## 2025-08-01 RX ORDER — ONDANSETRON 4 MG/1
4 TABLET, FILM COATED ORAL EVERY 6 HOURS PRN
Status: DISCONTINUED | OUTPATIENT
Start: 2025-08-01 | End: 2025-08-03 | Stop reason: HOSPADM

## 2025-08-01 RX ORDER — LABETALOL HYDROCHLORIDE 5 MG/ML
20 INJECTION, SOLUTION INTRAVENOUS ONCE AS NEEDED
Status: DISCONTINUED | OUTPATIENT
Start: 2025-08-01 | End: 2025-08-03 | Stop reason: HOSPADM

## 2025-08-01 RX ORDER — OXYTOCIN/0.9 % SODIUM CHLORIDE 30/500 ML
2-30 PLASTIC BAG, INJECTION (ML) INTRAVENOUS CONTINUOUS
Status: DISCONTINUED | OUTPATIENT
Start: 2025-08-01 | End: 2025-08-03 | Stop reason: HOSPADM

## 2025-08-01 RX ORDER — TRANEXAMIC ACID 1 G/10ML
1000 INJECTION, SOLUTION INTRAVENOUS ONCE AS NEEDED
Status: DISCONTINUED | OUTPATIENT
Start: 2025-08-01 | End: 2025-08-03 | Stop reason: HOSPADM

## 2025-08-01 RX ORDER — ADHESIVE BANDAGE
10 BANDAGE TOPICAL
Status: DISCONTINUED | OUTPATIENT
Start: 2025-08-01 | End: 2025-08-03 | Stop reason: HOSPADM

## 2025-08-01 RX ORDER — OXYTOCIN 10 [USP'U]/ML
10 INJECTION, SOLUTION INTRAMUSCULAR; INTRAVENOUS ONCE AS NEEDED
Status: DISCONTINUED | OUTPATIENT
Start: 2025-08-01 | End: 2025-08-03 | Stop reason: HOSPADM

## 2025-08-01 RX ORDER — MISOPROSTOL 200 UG/1
800 TABLET ORAL ONCE AS NEEDED
Status: DISCONTINUED | OUTPATIENT
Start: 2025-08-01 | End: 2025-08-03 | Stop reason: HOSPADM

## 2025-08-01 RX ORDER — METHYLERGONOVINE MALEATE 0.2 MG/ML
0.2 INJECTION INTRAVENOUS ONCE AS NEEDED
Status: DISCONTINUED | OUTPATIENT
Start: 2025-08-01 | End: 2025-08-03 | Stop reason: HOSPADM

## 2025-08-01 RX ORDER — HYDRALAZINE HYDROCHLORIDE 20 MG/ML
5 INJECTION INTRAMUSCULAR; INTRAVENOUS ONCE AS NEEDED
Status: DISCONTINUED | OUTPATIENT
Start: 2025-08-01 | End: 2025-08-03 | Stop reason: HOSPADM

## 2025-08-01 RX ORDER — OXYTOCIN/0.9 % SODIUM CHLORIDE 30/500 ML
60 PLASTIC BAG, INJECTION (ML) INTRAVENOUS ONCE AS NEEDED
Status: DISCONTINUED | OUTPATIENT
Start: 2025-08-01 | End: 2025-08-03 | Stop reason: HOSPADM

## 2025-08-01 RX ADMIN — CETIRIZINE HYDROCHLORIDE 10 MG: 10 TABLET, FILM COATED ORAL at 07:31

## 2025-08-01 RX ADMIN — ACETAMINOPHEN 975 MG: 325 TABLET ORAL at 16:32

## 2025-08-01 RX ADMIN — MISOPROSTOL 25 MCG: 100 TABLET ORAL at 01:20

## 2025-08-01 RX ADMIN — PANTOPRAZOLE SODIUM 20 MG: 20 TABLET, DELAYED RELEASE ORAL at 07:29

## 2025-08-01 RX ADMIN — IBUPROFEN 600 MG: 600 TABLET ORAL at 16:32

## 2025-08-01 RX ADMIN — MISOPROSTOL 25 MCG: 100 TABLET ORAL at 05:26

## 2025-08-01 RX ADMIN — MISOPROSTOL 25 MCG: 100 TABLET ORAL at 03:21

## 2025-08-01 RX ADMIN — LABETALOL HYDROCHLORIDE 100 MG: 100 TABLET, FILM COATED ORAL at 20:28

## 2025-08-01 RX ADMIN — SODIUM CHLORIDE, SODIUM LACTATE, POTASSIUM CHLORIDE, AND CALCIUM CHLORIDE 75 ML/HR: .6; .31; .03; .02 INJECTION, SOLUTION INTRAVENOUS at 09:46

## 2025-08-01 RX ADMIN — ACETAMINOPHEN 975 MG: 325 TABLET ORAL at 22:32

## 2025-08-01 RX ADMIN — Medication 2 MILLI-UNITS/MIN: at 09:59

## 2025-08-01 RX ADMIN — LABETALOL HYDROCHLORIDE 100 MG: 100 TABLET, FILM COATED ORAL at 07:31

## 2025-08-01 RX ADMIN — IBUPROFEN 600 MG: 600 TABLET ORAL at 22:32

## 2025-08-01 SDOH — HEALTH STABILITY: MENTAL HEALTH: CURRENT SMOKER: 0

## 2025-08-01 ASSESSMENT — PAIN SCALES - GENERAL
PAINLEVEL_OUTOF10: 3
PAINLEVEL_OUTOF10: 3
PAINLEVEL_OUTOF10: 0 - NO PAIN
PAINLEVEL_OUTOF10: 3
PAINLEVEL_OUTOF10: 6
PAINLEVEL_OUTOF10: 6
PAINLEVEL_OUTOF10: 0 - NO PAIN
PAINLEVEL_OUTOF10: 7
PAINLEVEL_OUTOF10: 8
PAINLEVEL_OUTOF10: 3
PAINLEVEL_OUTOF10: 3
PAINLEVEL_OUTOF10: 7
PAINLEVEL_OUTOF10: 3

## 2025-08-01 NOTE — SIGNIFICANT EVENT
Late entry for 830    In to introduce myself to patient has oncoming physician.  Undergoing IOL for cHTN on meds.  Feeling well.  Contractions getting a little more uncomfortable after AROM.  Amenable to starting pitocin at 9 am. Planning NCB.  FHTs currently cat 1.  All questions answered.    Che Domingo MD

## 2025-08-01 NOTE — H&P
OB Admission H&P    Assessment/Plan    Karine Ruiz is a 29 y.o.  at 38w3d, GLORIA: 2025, by Last Menstrual Period, who presents for Induction of Labor.    Plan  -Admit to L&D, consented  -T&S, CBC, and Syphilis  -Epidural at patient request  -Recheck as clinically indicated by maternal or fetal status  -Plan to initiate induction with oral cytotec    Fetal Status  -NST reactive, reassuring   -Presentation vertex based on vaginal exam  -EFW 8-0 by 36 week US  -GBS Negative    Postpartum  Contraception Plan: not available at [facility]    Pregnancy Problems (from 24 to present)       Problem Noted Diagnosed Resolved    High-risk pregnancy in third trimester ([facility]) 2025 by Gaby Thayer MD  No    Priority:  High       Overview Addendum 2025  6:57 PM by Gaby Thayer MD   Discuss again w pt at next appt   High risk preg / to  recommend IOL at 37 weeks - pt last chose 38 wk    BMI 45 now : wt gain 28  lbs / EFW 7 lbs at 36weeks fetal AC > 96 %  / risk of PPH. Csection . PEC          Chronic hypertension affecting pregnancy ([facility]) 2025 by Jeevan Robertson MD  No    Priority:  Medium       Anemia during pregnancy in third trimester 2025 by Gaby Thayer MD  No    Priority:  Medium       Overview Addendum 2025 10:12 AM by Gaby Thayer MD   Added iron RX daily   -pt requested iron infusions x 3  done         Single umbilical artery ([facility]) 4/3/2025 by Gaby Thayer MD  No    Priority:  Medium       Overview Signed 2025  9:05 PM by Gaby Thayer MD   Seen at 20 weeks   Follow up growth scans          Rubella non-immune status, antepartum ([facility]) 2025 by Gaby Thayer MD  No    Priority:  Medium       Overview Signed 2025  2:11 PM by Gaby Thayer MD   Vaccine postpartum          HTN in pregnancy, chronic ([facility]) 2025 by Gaby Thayer MD  No    Priority:  Medium       Overview Addendum 2025   6:51 PM by Gaby Thayer MD   CHTN on Labetalol 100 BID   To check home BPs weekly goal is 140 /80s  Pt aware of incr risk Preeclampsia / had it last preg at 39 weeks  PLAN:  32w NST or BPP weekly , deliver at 37 w = pt wants 38 week IOL on July 31 8pm so baby can be born Aug 1  , aware of risks of PEC , to follow closely / does Home BP checks    Growth scans 76 % at  36 w    Home BPs 140s         36 weeks gestation of pregnancy ([facility]) 12/23/2024 by Gaby Thayer MD  No    Priority:  Medium       Overview Addendum 7/29/2025  9:51 AM by Gaby Thayer MD   Desired provider in labor: [] CNM  [x] Physician  [x] Blood Products: [x] Yes, accepts [] No, needs counseling  [x] Initial BMI: 41.86   [x] Prenatal Labs:   [x] Cervical Cancer Screening up to date wnl 8/2024  [x] Rh status: A pos   [x] Genetic Screening:  XX cfDNA wnl, carrier screen neg   [x] NT US: (11-13 wks)  [x] Baby ASA (if indicated):  [x] Pregnancy dated by: LMP    [x] Anatomy US: (19-20 wks)  [x] Federal Sterilization consent signed (if indicated): NA   [x] 1hr GCT at 24-28wks: wnl  [x] Rhogam (if indicated):   No  A pos   [x] Fetal Surveillance (if indicated): weekly NST 32 wk   [x] Tdap (27-32 wks, may be given up to 36 wks if initial window missed):     [x] Breastfeeding:  [] Postpartum Birth control method:   [x] GBS at 36 - 37 wks:  [x] 39 weeks discussion of IOL vs. Expectant management: IOL at 38  wk July 31 8 pm  [x] Mode of delivery ( anticipated ): vag         Class 3 severe obesity with body mass index (BMI) of 40.0 to 44.9 in adult 5/9/2023 by German Sharp DO  No    Priority:  Medium       Overview Addendum 5/29/2025  2:26 PM by Gaby Thayer MD   Healthy diet discussed - wt gain goal 20 lbs max   Follow up growth scans and Fetal ECHO = wnl          Decreased fetal movements in third trimester ([facility]) 6/11/2025 by Deepti Diaz MD  6/30/2025 by Gaby Thayer MD    Overview Signed 6/11/2025 12:03 PM  by Deepti Diaz MD   Bedside BPP 8/8. AMANDA subjectively normal.                  Subjective   Good fetal movement.  Denies vaginal bleeding., Denies contractions., Denies leaking of fluid.      Prenatal Provider Flaca    OB History    Para Term  AB Living   2 1 1 0 0 1   SAB IAB Ectopic Multiple Live Births   0 0 0 0 1      # Outcome Date GA Lbr Phillip/2nd Weight Sex Type Anes PTL Lv   2 Current            1 Term 16 39w1d  3.487 kg F Vag-Spont EPI  MARCI      Name: Rose       Surgical History[1]    Social History     Tobacco Use    Smoking status: Never    Smokeless tobacco: Never   Substance Use Topics    Alcohol use: Not Currently     Comment: socially       Allergies[2]    Prescriptions Prior to Admission[3]  Objective     Last Vitals  Temp Pulse Resp BP MAP O2 Sat   36.7 °C (98 °F) 82 18 120/60 80 98 %     Blood Pressures         2025  2317          BP: 140/72 120/60               Physical Exam  General: NAD, mood appropriate  Cardiopulmonary: warm and well perfused, breathing comfortably on room air  Abdomen: Gravid, non-tender  Extremities: Symmetric  Speculum Exam: deferred  Cervix: 3 /50 /-1     Chaperone Present: Yes.  Chaperone Name/Title: Radha   Examination Chaperoned: Gynecological Exam     Fetal Monitoring  Baseline: 140 bpm, Variability: moderate,  Accelerations: present and Decelerations: none  Uterine Activity: No contractions seen on toco  Interpretation: Reactive      Labs in chart were reviewed.   Results from last 7 days   Lab Units 25  2112 25  1411   WBC AUTO x10*3/uL 12.9* 12.0*   HEMOGLOBIN g/dL 11.0* 11.0*   HEMATOCRIT % 32.3* 33.7*   PLATELETS AUTO x10*3/uL 327 293   AST U/L  --  14   ALT U/L  --  12   CREATININE mg/dL  --  0.46*        Prenatal labs reviewed, not remarkable.             [1]   Past Surgical History:  Procedure Laterality Date    OTHER SURGICAL HISTORY  2016    Esophagoscopy Rigid    OTHER SURGICAL HISTORY   04/22/2019    [place] tooth extraction    OTHER SURGICAL HISTORY  04/22/2019    Esophagogastroduodenoscopy    WISDOM TOOTH EXTRACTION     [2]   Allergies  Allergen Reactions    Bee Pollen Unknown    Bee Venom Protein (Honey Bee) Unknown    Cat Dander Unknown   [3]   Medications Prior to Admission   Medication Sig Dispense Refill Last Dose/Taking    labetalol (Normodyne) 100 mg tablet Take 0.5 tablets (50 mg) by mouth 2 times a day.   7/31/2025 at  7:00 PM    albuterol 90 mcg/actuation inhaler Inhale 2 puffs every 6 hours if needed for wheezing.   Unknown    cetirizine (ZyrTEC) 10 mg tablet Zyrtec 10 MG TABS   Refills: 0       Active       pantoprazole (ProtoNix) 20 mg EC tablet TAKE 1 TABLET DAILY 90 tablet 3     PNV no.103/folic/om3s/fish oil (PRENATAL WITH DHA-FOLIC ACID ORAL) Take by mouth once daily.

## 2025-08-01 NOTE — ANESTHESIA PREPROCEDURE EVALUATION
Patient: Karine Ruiz    Evaluation Method: In-person visit    Procedure Information    Date: 08/01/25  Procedure: Labor Consult         Relevant Problems   Cardiac   (+) Chronic hypertension affecting pregnancy ([facility])   (+) HTN in pregnancy, chronic ([facility])      GI   (+) Gastroesophageal reflux disease      Endocrine   (+) Class 3 severe obesity with body mass index (BMI) of 40.0 to 44.9 in adult      Hematology   (+) Anemia during pregnancy in third trimester      GYN   (+) 36 weeks gestation of pregnancy ([facility])   (+) High-risk pregnancy in third trimester ([facility])       Clinical information reviewed:   Tobacco  Allergies    Med Hx  Surg Hx   Fam Hx          NPO Detail:  No data recorded     OB/Gyn Evaluation    Present Pregnancy    Patient is pregnant now.   Obstetric History                Physical Exam    Airway  Mallampati: II  TM distance: >3 FB  Neck ROM: full  Mouth opening: 3 or more finger widths     Cardiovascular - normal exam   Dental - normal exam     Pulmonary - normal exam   Abdominal (+) obese             Anesthesia Plan    History of general anesthesia?: yes  History of complications of general anesthesia?: no    ASA 3     epidural     The patient is not a current smoker.    Anesthetic plan and risks discussed with patient and spouse.  Use of blood products discussed with patient and spouse who consented to blood products.    Plan discussed with CRNA.

## 2025-08-01 NOTE — CARE PLAN
The patient's goals for the shift include Have a healthy baby    The clinical goals for the shift include FHR remains reassuring during induction    Over the shift, the patient did make progress toward the following goals.

## 2025-08-01 NOTE — L&D DELIVERY NOTE
Vaginal Delivery Note    Patient Name: Karine Ruiz  : 1995  MRN: [id number]  Age: 29 y.o.    /Para:   Gestational Age: 38w3d    Date of Delivery: 2025    Procedure: Normal Spontaneous Vaginal Delivery    Delivery Provider: Che Domingo MD    Resident/Fellow/Other Assistant: none    Description of Procedure:  Delivery of viable infant under no anesthesia. Delayed clamping was performed. The infant was placed skin to skin. Cord gases were not sent.  Cord blood was collected. Placenta delivered intact and fundus was firm.    No lacerations noted.  A very small perineal abrasion was hemostatic and not repaired.    Additional Procedures:  None    Findings:   Amniotic fluid Clear, Female infant in Vertex Occiput Anterior presentation, APGARS 8 , 9 .  Birth Weight 3.48 kg.    Complications: None    Quantitative Blood Loss:   Delivery QBL: 250 mL (2025  2:53 PM - 2025  5:59 PM)    Blood products:      Uterotonics/Hemostatic Agent: IV Pitocin 30 units    Specimen:   Placenta  Delivered: 2025  2:59 PM  Appearance: Intact  Removal: Spontaneous    Disposition: pathology    Sponge/Instrument/Needle Counts: The sponge, lap and needle counts were correct.    Patient Disposition: Patient recovering on labor and delivery in stable condition.    Anival Ruiz [[id number]]      Labor Events    Rupture date/time: 2025 0700  Rupture type: Artificial  Fluid color: Clear  Fluid odor: None  Labor type: Induced Onset of Labor  Labor allowed to proceed with plans for an attempted vaginal birth?: Yes  Induction: Misoprostol, Oxytocin, AROM  First cervical ripening date/time: 2025 2114  Induction indications: Hypertensive Disorder of Pregnancy  Complications: None       Labor Event Times    Labor onset date/time: 2025 0959  Dilation complete date/time: 2025 1448  Start pushing date/time: 2025 14:48       Labor Length    1st stage: 4h 49m  2nd stage: 0h 05m  3rd stage: 0h  06m       Placenta    Placenta delivery date/time: 2025 14:59  Placenta removal: Spontaneous  Placenta appearance: Intact  Placenta disposition: pathology       Cord    Vessels: 2 vessels  Complications: Nuchal  Nuchal intervention: reduced  Nuchal cord description: loose nuchal cord  Number of loops: 1  Delayed cord clamping?: Yes  Cord clamped date/time: 2025 14:57:00  Cord blood disposition: Discarded  Gases sent?: No  Stem cell collection (by provider): No       Lacerations    Episiotomy: None  Perineal laceration: None  Other lacerations?: No  Repair suture: None       Anesthesia    Method: None       Operative Delivery    Forceps attempted?: No  Vacuum extractor attempted?: No       Shoulder Dystocia    Shoulder dystocia present?: No       [place] Delivery    Time head delivered: 2025 14:53:00  Birth date/time: 2025 14:53:00  Delivery type: Vaginal, Spontaneous  Complications: None       Resuscitation    Method: Tactile stimulation       Apgars    Living status: Living  Apgar Component Scores:  1 min.:  5 min.:  10 min.:  15 min.:  20 min.:    Skin color:  0  1       Heart rate:  2  2       Reflex irritability:  2  2       Muscle tone:  2  2       Respiratory effort:  2  2       Total:  8  9       Apgars assigned by: ISHMAEL MONTERROSO RN       Delivery Providers    Delivering clinician: Che Domingo MD   Provider Role    Luz Sahu RN Delivery Nurse    Johnny Monterroso MD Nursery Nurse     Resident                 None

## 2025-08-01 NOTE — SIGNIFICANT EVENT
Patient much more uncomfortable.  Ready to get in tub.  SVE 6-7/100/0.  FHTs cat 1.  Pitocin at 8 mu/min.  Anticipate .     Che Domingo MD

## 2025-08-02 PROCEDURE — 2500000001 HC RX 250 WO HCPCS SELF ADMINISTERED DRUGS (ALT 637 FOR MEDICARE OP): Performed by: OBSTETRICS & GYNECOLOGY

## 2025-08-02 PROCEDURE — 1220000001 HC OB SEMI-PRIVATE ROOM DAILY

## 2025-08-02 PROCEDURE — 2500000004 HC RX 250 GENERAL PHARMACY W/ HCPCS (ALT 636 FOR OP/ED): Performed by: OBSTETRICS & GYNECOLOGY

## 2025-08-02 RX ORDER — OXYCODONE HYDROCHLORIDE 5 MG/1
TABLET ORAL
Status: DISPENSED
Start: 2025-08-02 | End: 2025-08-03

## 2025-08-02 RX ORDER — OXYCODONE HYDROCHLORIDE 5 MG/1
5 TABLET ORAL ONCE
Refills: 0 | Status: COMPLETED | OUTPATIENT
Start: 2025-08-02 | End: 2025-08-02

## 2025-08-02 RX ADMIN — IBUPROFEN 600 MG: 600 TABLET ORAL at 16:58

## 2025-08-02 RX ADMIN — ACETAMINOPHEN 975 MG: 325 TABLET ORAL at 10:56

## 2025-08-02 RX ADMIN — LABETALOL HYDROCHLORIDE 100 MG: 100 TABLET, FILM COATED ORAL at 08:34

## 2025-08-02 RX ADMIN — POLYETHYLENE GLYCOL 3350 17 G: 17 POWDER, FOR SOLUTION ORAL at 23:05

## 2025-08-02 RX ADMIN — IBUPROFEN 600 MG: 600 TABLET ORAL at 04:29

## 2025-08-02 RX ADMIN — LABETALOL HYDROCHLORIDE 100 MG: 100 TABLET, FILM COATED ORAL at 20:59

## 2025-08-02 RX ADMIN — OXYCODONE HYDROCHLORIDE 5 MG: 5 TABLET ORAL at 20:59

## 2025-08-02 RX ADMIN — IBUPROFEN 600 MG: 600 TABLET ORAL at 23:05

## 2025-08-02 RX ADMIN — ACETAMINOPHEN 975 MG: 325 TABLET ORAL at 04:29

## 2025-08-02 RX ADMIN — ACETAMINOPHEN 975 MG: 325 TABLET ORAL at 23:05

## 2025-08-02 RX ADMIN — IBUPROFEN 600 MG: 600 TABLET ORAL at 10:56

## 2025-08-02 RX ADMIN — ACETAMINOPHEN 975 MG: 325 TABLET ORAL at 16:58

## 2025-08-02 ASSESSMENT — PAIN SCALES - GENERAL
PAINLEVEL_OUTOF10: 0 - NO PAIN
PAINLEVEL_OUTOF10: 2
PAINLEVEL_OUTOF10: 0 - NO PAIN
PAINLEVEL_OUTOF10: 2
PAINLEVEL_OUTOF10: 0 - NO PAIN
PAINLEVEL_OUTOF10: 8
PAINLEVEL_OUTOF10: 0 - NO PAIN

## 2025-08-02 ASSESSMENT — PAIN DESCRIPTION - DESCRIPTORS: DESCRIPTORS: ACHING

## 2025-08-02 NOTE — PROGRESS NOTES
PPD  1     She is doing well after delivery. BPs elevated in labor, now wnl on Labetalol 100 BID     -Pain is well controlled with PO pain meds  -Ambulating   -Tolerating regular diet   -Voiding spontaneously   -Moods are normal    Physical Exam  Afebrile , Vitals wnl.    Constitutional:      Appearance: Normal appearance. Awake , alert   Abdominal:     Abdomen is soft and nontender . Fundus is below umbilicus. There is no mass.     Extremities:   No edema , nontender     ASSESSMENT /PLAN:     Medical Problems       Problem List       * (Principal) Chronic hypertension affecting pregnancy ([facility])    HTN in pregnancy, chronic ([facility]-HCC)    Overview Addendum 7/20/2025  6:51 PM by Gaby Thayer MD   CHTN on Labetalol 100 BID   To check home BPs weekly goal is 140 /80s  Pt aware of incr risk Preeclampsia / had it last preg at 39 weeks  PLAN: cont obs                          Rubella non-immune status, antepartum ([facility])    Overview Signed 1/23/2025  2:11 PM by Gaby Thayer MD   Vaccine postpartum                  Anemia during pregnancy in third trimester    Overview Addendum 7/7/2025 10:12 AM by Gaby Thayer MD   Added iron RX daily   6/11-pt requested iron infusions x 3  done  Hb = 11.0 yest  doing well        Gastroesophageal reflux disease    Overview Signed 12/23/2024 10:53 AM by Gaby Thayer MD   On Protonix - safe in preg         Exercise-induced asthma    Overview Signed 12/23/2024 11:11 AM by Gaby Thayer MD   Has albuterol inhaler prn                         Postpartum doing well   Anemia Hb 11.0  Continue to monitor for si/sx of anemia.     CHTN  on Labetalol 100 BID     -Lactation consultant PRN  -SCDs, ambulation  Routine postpartum care .     Gaby Thayer MD

## 2025-08-02 NOTE — CARE PLAN
The patient's goals for the shift include bond with baby    The clinical goals for the shift include Return to prepregnancy state    Problem: Vaginal Birth or  Section  Goal: Minimal s/sx of HDP and BP<160/110  Outcome: Progressing     Problem: Pain - Adult  Goal: Verbalizes/displays adequate comfort level or baseline comfort level  Outcome: Progressing  Flowsheets (Taken 2025)  Verbalizes/displays adequate comfort level or baseline comfort level:   Assess pain using appropriate pain scale   Encourage patient to monitor pain and request assistance   Administer analgesics based on type and severity of pain and evaluate response   Implement non-pharmacological measures as appropriate and evaluate response   Notify Licensed Independent Practitioner if interventions unsuccessful or patient reports new pain   Consider cultural and social influences on pain and pain management     Problem: Safety - Adult  Goal: Free from fall injury  Outcome: Progressing  Flowsheets (Taken 2025)  Free from fall injury: Instruct family/caregiver on patient safety     Problem: Postpartum  Goal: Minimal s/sx of HDP and BP<160/110  Outcome: Progressing  Goal: Experiences normal postpartum course  Outcome: Progressing  Flowsheets (Taken 2025)  Experiences normal postpartum course:   Monitor maternal vital signs   Assess uterine involution   Med administration/monitoring of effect   Fundal and lochia asssessments w/fundal massage, bladder emptying   Assist with identification of coping methods and supprot resources  Goal: Appropriate maternal -  bonding  Outcome: Progressing  Flowsheets (Taken 2025)  Appropriate maternal- bonding:   Identify family support   Referral to  or  as needed   24-hour rooming in   Assist with identification of coping methods and support resources  Goal: Establish and maintain infant feeding pattern for adequate nutrition  Outcome:  Progressing  Flowsheets (Taken 2025)  Establish and maintain infant feeding pattern for adequate nutrition:   Assess  human milk feeding   Refer to lactation consultant as needed   Assess formula feeding   Encourage feeding and/or pumping at least 8x/day  Goal: Incisions, wounds, or drain sites healing without S/S of infection  Outcome: Progressing  Flowsheets (Taken 2025)  Incisions, wounds, or drain sites healing without sign and symptoms of infection:   ADMISSION and DAILY: Assess and document risk factors for pressure ulcer development   TWICE DAILY: Assess and document skin integrity   TWICE DAILY: Assess and document dressing/incision, wound bed, drain sites and surrounding tissue  Goal: No s/sx infection  Outcome: Progressing  Flowsheets (Taken 2025)  No s/sx of infection:   Monitor QBL and vital signs   Hygiene practices/tesfaye-care   Fundal + lochia assessments w/fundal massage, bladder emptying, intrauterine device when indicated  Goal: No s/sx of hemorrhage  Outcome: Progressing  Flowsheets (Taken 2025)  No s/sx of hemorrhage: Monitor QBL and vital signs     Problem: Vaginal Birth or  Section  Goal: Fetal and maternal status remain reassuring during the birth process  Outcome: Met  Goal: Tolerate CRB for IOL placement maintenance until dislodgement/removal 12hrs after placement  Outcome: Met  Goal: Prevention of malpresentation/labor dystocia through delivery  Outcome: Met  Goal: Demonstrates labor coping techniques through delivery  Outcome: Met  Goal: No s/sx of infection through recovery  Outcome: Met  Goal: No s/sx of hemorrhage through recovery  Outcome: Met

## 2025-08-02 NOTE — LACTATION NOTE
Lactation Consultant Note  Lactation Consultation  Reason for Consult: Initial assessment  Consultant Name: Sonya Castaneda    Maternal Information  Has mother  before?: Yes  How long did the mother previously breastfeed?: older child age 9, BF for 2 years  Previous Maternal Breastfeeding Challenges: Tongue tie  Infant to breast within first 2 hours of birth?: Yes  Exclusive Pump and Bottle Feed: No    Maternal Assessment  Breast Assessment: Large, Symmetrical, Firm, Compressible  Nipple Assessment: Intact, Erect  Areola Assessment: Normal    Infant Assessment  Infant Behavior: Awake, Fussy, Suckles on and off, needs stimulation  Infant Assessment: Sublingual frenulum (comment), Tongue protrudes over alveolar ridge (frenulum thick, attaches between midline and apex. heart shaping of tongue with extension. limited elevation centrally)    Feeding Assessment  Nutrition Source: Breastmilk  Feeding Method: Nursing at the breast, Feeding expressed breastmilk, Spoon feeding  Feeding Position: Cradle, Skin to skin, Breast sandwich, Mother needs assistance with latch/positioning, Infant not tucked close and facing mother  Suck/Feeding: Unsustained, Clenching/biting, Tactile stimulation needed, Other (Comment)  Latch Assessment: Reluctant, Moderate assistance is needed, Instructed on deep latch, Latch achieved after repeated attempts    LATCH TOOL  Latch: Repeated attempts, hold nipple in mouth, stimulate to suck  Audible Swallowing: A few with stimulation  Type of Nipple: Everted (After stimulation)  Comfort (Breast/Nipple): Soft/non-tender  Hold (Positioning): Minimal assist, teach one side, mother does other, staff holds  LATCH Score: 7    Breast Pump  Pump: Hand expression, Manual pump  Frequency: Other (comment) (As needed for poor feeding)  Duration: Less than 15 minutes per session  Breast Shield Size and Type: 21 mm  Volume of Milk Production: 3  Units of Volume: mL per session    Other OB Lactation Tools        Patient Follow-up  Inpatient Lactation Follow-up Needed : Yes  Outpatient Lactation Follow-up: Recommended    Other OB Lactation Documentation  Maternal Risk Factors: Hypertension, Obesity (pre-pregnancy BMI >30)  Infant Risk Factors: Early term birth 37-39 weeks, Poor or painful latch / restricted feedings    Recommendations/Summary  , 38.3 weeks, NCB on @1453. Birthweight 3480g. TCB 4.8@13 hours. Mother experienced, older child age 9,  with Maura for 2 years. Mild tongue tie, never revised. Reports this infant not sustaining latch, fussy/sleepy. Pumped colostrum brought from home, syringe feeding infant and attempting to latch.    First consult, 0800: Infant skin to skin, parents report attempting to latch for the last 45 min, not able to sustain latch. Oral exam done, lingual frenulum attached between midline and apex, tongue heart shaping, limited extension and elevation. Sides elevate, middle of tongue stays down. Weak, uncoordinated suck, improves with oral/facial massage and suck training.  Taught ABC's of good positioning: arms around breast, infant belly to belly with parent, infant's curve of hip to parent's curve of body. Taught to have infant rest their chin on the breast below the areola. Parents instructed to wait for gape reflex elicited by chin pressure on the breast, before hugging infant close to facilitate latching. Parents demonstrate technique with minimal assistance from IBCLC to time latching. Infant not sustaining suck or showing interest in breast. Encouraged skin to skin, will return.    Second consult, 1130: Parents endorse infant sleepy. Assisted with gentle waking and oral/facial massage. Reviewed deep latching. Taught laid back hold, infant latched with wide gape, bursts of sucking. Not sustaining suck without tactile stimulation. Encouraged skin to skin, hand expressing colostrum and syringe feeding to infant.    Plan:  Cue based feeding, at least 8-12 times in 24  hours  Frequent skin to skin  Hand express and syringe feed EBM for poor feeds/extra volume  Call for assistance as needed

## 2025-08-02 NOTE — CARE PLAN
The patient's goals for the shift include to bond with baby    The clinical goals for the shift include to maintain BP below severe range    Over the shift, the patient did make progress toward the following goals.      Problem: Vaginal Birth or  Section  Goal: Minimal s/sx of HDP and BP<160/110  Outcome: Progressing  Flowsheets (Taken 2025)  Minimal s/sx of HDP and BP <160/110:   Med administration/monitoring of effect   Monitor QBL and vital signs     Problem: Pain - Adult  Goal: Verbalizes/displays adequate comfort level or baseline comfort level  Outcome: Progressing     Problem: Safety - Adult  Goal: Free from fall injury  Outcome: Progressing  Flowsheets (Taken 2025)  Free from fall injury:   Instruct family/caregiver on patient safety   Based on caregiver fall risk screen, instruct family/caregiver to ask for assistance with transferring infant if caregiver noted to have fall risk factors     Problem: Discharge Planning  Goal: Discharge to home or other facility with appropriate resources  Outcome: Progressing  Flowsheets (Taken 2025)  Discharge to home or other facility with appropriate resources:   Identify barriers to discharge with patient and caregiver   Arrange for needed discharge resources and transportation as appropriate   Identify discharge learning needs (meds, wound care, etc)     Problem: Postpartum  Goal: Minimal s/sx of HDP and BP<160/110  Outcome: Progressing  Flowsheets (Taken 2025)  Minimal s/sx of HDP and BP <160/110:   Med administration/monitoring of effect   Monitor QBL and vital signs  Goal: Experiences normal postpartum course  Outcome: Progressing  Flowsheets (Taken 2025 0516 by Shaikla Crump RN)  Experiences normal postpartum course:   Monitor maternal vital signs   Assess uterine involution   Med administration/monitoring of effect   Fundal and lochia asssessments w/fundal massage, bladder emptying   Assist with identification  of coping methods and supprot resources  Goal: Appropriate maternal -  bonding  Outcome: Progressing  Flowsheets (Taken 2025 by Shakila Crump, RN)  Appropriate maternal- bonding:   Identify family support   Referral to  or  as needed   24-hour rooming in   Assist with identification of coping methods and support resources  Goal: Establish and maintain infant feeding pattern for adequate nutrition  Outcome: Progressing  Flowsheets (Taken 2025 by Shakila Crump, RN)  Establish and maintain infant feeding pattern for adequate nutrition:   Assess  human milk feeding   Refer to lactation consultant as needed   Assess formula feeding   Encourage feeding and/or pumping at least 8x/day  Goal: Incisions, wounds, or drain sites healing without S/S of infection  Outcome: Progressing  Flowsheets (Taken 2025 by Shakila Crump RN)  Incisions, wounds, or drain sites healing without sign and symptoms of infection:   ADMISSION and DAILY: Assess and document risk factors for pressure ulcer development   TWICE DAILY: Assess and document skin integrity   TWICE DAILY: Assess and document dressing/incision, wound bed, drain sites and surrounding tissue  Goal: No s/sx infection  Outcome: Progressing  Flowsheets (Taken 2025 by Shakila Crump RN)  No s/sx of infection:   Monitor QBL and vital signs   Hygiene practices/tesfaye-care   Fundal + lochia assessments w/fundal massage, bladder emptying, intrauterine device when indicated  Goal: No s/sx of hemorrhage  Outcome: Progressing  Flowsheets (Taken 2025 184)  No s/sx of hemorrhage:   Monitor QBL and vital signs   Fundal + lochia assessments w/fundal massage, bladder emptying, intrauterine device when indicated

## 2025-08-02 NOTE — PROGRESS NOTES
Spiritual Care Visit  Spiritual Care Request    Reason for Visit:  Routine Visit: Introduction     Request Received From:       Focus of Care:  Visited With: Patient and family together         Refer to :          Spiritual Care Assessment    Spiritual Assessment:                      Care Provided:       Sense of Community and or Shinto Affiliation:  Agnostic         Addressed Needs/Concerns and/or Cynthia Through:          Outcome:        Advance Directives:         Spiritual Care Annotation    Annotation:  Purpose of visit was to congratulate mom and baby, dad was present.  Mom stated she is agnostic.  I have updated her medical record.  This is baby #2 for the family.  Parents were very engaging.  Parents accepted the gift of the baby bible with gratitude.

## 2025-08-03 VITALS
RESPIRATION RATE: 16 BRPM | DIASTOLIC BLOOD PRESSURE: 74 MMHG | SYSTOLIC BLOOD PRESSURE: 116 MMHG | HEIGHT: 64 IN | BODY MASS INDEX: 46.35 KG/M2 | HEART RATE: 67 BPM | WEIGHT: 271.5 LBS | OXYGEN SATURATION: 97 % | TEMPERATURE: 98.6 F

## 2025-08-03 PROCEDURE — 2500000001 HC RX 250 WO HCPCS SELF ADMINISTERED DRUGS (ALT 637 FOR MEDICARE OP): Performed by: OBSTETRICS & GYNECOLOGY

## 2025-08-03 RX ORDER — LABETALOL 100 MG/1
100 TABLET, FILM COATED ORAL 2 TIMES DAILY
Qty: 60 TABLET | Refills: 3 | Status: ON HOLD | OUTPATIENT
Start: 2025-08-03 | End: 2025-08-06

## 2025-08-03 RX ORDER — ACETAMINOPHEN 500 MG
1000 TABLET ORAL EVERY 6 HOURS PRN
COMMUNITY
Start: 2025-08-03

## 2025-08-03 RX ORDER — IBUPROFEN 200 MG
600 TABLET ORAL EVERY 6 HOURS PRN
COMMUNITY
Start: 2025-08-03

## 2025-08-03 RX ADMIN — IBUPROFEN 600 MG: 600 TABLET ORAL at 04:58

## 2025-08-03 RX ADMIN — IBUPROFEN 600 MG: 600 TABLET ORAL at 11:06

## 2025-08-03 RX ADMIN — ACETAMINOPHEN 975 MG: 325 TABLET ORAL at 11:06

## 2025-08-03 RX ADMIN — LABETALOL HYDROCHLORIDE 100 MG: 100 TABLET, FILM COATED ORAL at 08:35

## 2025-08-03 RX ADMIN — ACETAMINOPHEN 975 MG: 325 TABLET ORAL at 04:58

## 2025-08-03 ASSESSMENT — PAIN SCALES - GENERAL: PAINLEVEL_OUTOF10: 3

## 2025-08-03 NOTE — LACTATION NOTE
Lactation Consultant Note  Lactation Consultation  Reason for Consult: Follow-up assessment  Consultant Name: Sonya Castaneda    Maternal Information  Has mother  before?: Yes  How long did the mother previously breastfeed?: older child age 9, BF for 2 years  Previous Maternal Breastfeeding Challenges: Tongue tie  Infant to breast within first 2 hours of birth?: Yes  Exclusive Pump and Bottle Feed: No    Maternal Assessment  Breast Assessment: Large, Symmetrical, Full, Firm, Warm, Compressible  Nipple Assessment: Intact, Erect  Areola Assessment: Normal    Infant Assessment  Infant Behavior: Awake, Fussy, Sucking  Infant Assessment: Sublingual frenulum (comment), Tongue protrudes over alveolar ridge (frenulum thick, attaches between midline and apex. heart shaping of tongue with extension. limited elevation centrally)    Feeding Assessment  Nutrition Source: Breastmilk  Feeding Method: Nursing at the breast, Feeding expressed breastmilk, Syringe feeding  Feeding Position: Football/seated, Breast sandwich, Mother demonstrates good positioning  Suck/Feeding: Sustained, Coordinated suck/swallow/breathe, Baby led rhythmically, Audible swallowing with stimulaton, Nipple shield used  Latch Assessment: Minimal assistance is needed, Instructed on deep latch, Latch achieved, Wide open mouth < 160, Bursts of sucking, swallowing, and rest, Frequent audible swallows    LATCH TOOL  Latch: Grasps breast, tongue down, lips flanged, rhythmic sucking  Audible Swallowing: Spontaneous and intermittent (24 hours old)  Type of Nipple: Everted (After stimulation)  Comfort (Breast/Nipple): Soft/non-tender  Hold (Positioning): Minimal assist, teach one side, mother does other, staff holds  LATCH Score: 9    Breast Pump  Pump: Manual pump, Hand expression  Frequency: Other (comment) (As needed for poor feeding or engorgement)  Duration: Less than 15 minutes per session  Breast Shield Size and Type: 21 mm  Volume of Milk Production:  2  Units of Volume: Ounces per session    Other OB Lactation Tools  Lactation Tools: Nipple shields    Patient Follow-up  Inpatient Lactation Follow-up Needed : No  Outpatient Lactation Follow-up: Recommended  Lactation Professional - OK to Discharge: Yes    Other OB Lactation Documentation  Maternal Risk Factors: Obesity (pre-pregnancy BMI >30), Hypertension  Infant Risk Factors: Early term birth 37-39 weeks    Recommendations/Summary  See previous note for history. 5.08% weight loss. TCB 8.6@36 hours. Parents endorse latch improving overnight, infant latching with nipple shield on both breasts. Breasts full, using manual pump for comfort. Last feeding syringe fed, infant fussy and popping on and off. LC at bedside for next feeding. Attempting to latch in cradle hold, not in good alignment, mother tensing, nipple too low in mouth. Taught football hold and breast sandwich technique. Infant latched easily with wide gape, flanged lips, bursts of sucking, swallowing, and rest. Latched with nipple shield. Encouraged attempting without. Given 16mm nipple shield to try.     Discharge teaching reviewed. Taught engorgement management. Reviewed s/s of plugged ducts and mastitis and treatment. Reviewed normal feeding patterns of the “4th trimester” and outpatient support.

## 2025-08-03 NOTE — DISCHARGE SUMMARY
Discharge Summary    Admission Date: 2025  Discharge Date: 8/3/2025     Discharge Diagnosis  Chronic hypertension affecting pregnancy ([facility])    Hospital Course  Delivery Date: 2025 2:53 PM  Delivery type: Vaginal, Spontaneous   GA at delivery: 38w3d  Outcome: Living  Anesthesia during delivery: None  Intrapartum complications: None  Feeding method: Breastfeeding Status: Yes     Procedures:   Contraception at discharge: none      Pertinent Physical Exam At Time of Discharge  General: Examination reveals a well developed, well nourished, female, in no acute distress. She is alert and cooperative.  Fundus: firm and below umbilicus.  Extremities: no redness or tenderness in the calves or thighs, no edema.  Psychological: awake and alert; oriented to person, place, and time.    Discharge Meds     Your medication list        START taking these medications        Instructions Last Dose Given Next Dose Due   acetaminophen 500 mg tablet  Commonly known as: Tylenol Extra Strength      Take 2 tablets (1,000 mg) by mouth every 6 hours if needed for mild pain (1 - 3).       ibuprofen 200 mg tablet  Commonly known as: Motrin IB      Take 3 tablets (600 mg) by mouth every 6 hours if needed for mild pain (1 - 3).              CHANGE how you take these medications        Instructions Last Dose Given Next Dose Due   labetalol 100 mg tablet  Commonly known as: Normodyne  What changed: how much to take      Take 1 tablet (100 mg) by mouth 2 times a day.              CONTINUE taking these medications        Instructions Last Dose Given Next Dose Due   albuterol 90 mcg/actuation inhaler           cetirizine 10 mg tablet  Commonly known as: ZyrTEC           pantoprazole 20 mg EC tablet  Commonly known as: ProtoNix      TAKE 1 TABLET DAILY       PRENATAL WITH DHA-FOLIC ACID ORAL                     Where to Get Your Medications        These medications were sent to [id number] - [place], [place] - [address]   [address], [facility] [address]      Phone: [phone]   labetalol 100 mg tablet       You can get these medications from any pharmacy    You don't need a prescription for these medications  acetaminophen 500 mg tablet  ibuprofen 200 mg tablet          Complications Requiring Follow-Up  Chronic Hypertension    Test Results Pending At Discharge  Pending Labs       Order Current Status    Surgical Pathology Exam - [facility] Collected (08/01/25 1520)            Outpatient Follow-Up  Future Appointments   Date Time Provider Department Center   8/11/2025  2:20 PM Gaby Thayer MD [id number] [place]   9/16/2025  2:00 PM Gaby Thayer MD [id number] [place] [facility]\"" spent 10 minutes in the professional and overall care of this patient.      Jeevan Robertson MD

## 2025-08-03 NOTE — CARE PLAN
The patient's goals for the shift include to be discharged home    The clinical goals for the shift include to maintain BP WDL    Over the shift, the patient did make progress toward the following goals and is adequate for discharge.       Problem: Vaginal Birth or  Section  Goal: Minimal s/sx of HDP and BP<160/110  Outcome: Met  Flowsheets (Taken 2025)  Minimal s/sx of HDP and BP <160/110:   Med administration/monitoring of effect   Monitor QBL and vital signs     Problem: Pain - Adult  Goal: Verbalizes/displays adequate comfort level or baseline comfort level  Outcome: Met  Flowsheets (Taken 2025 0516 by Shakila Crump RN)  Verbalizes/displays adequate comfort level or baseline comfort level:   Assess pain using appropriate pain scale   Encourage patient to monitor pain and request assistance   Administer analgesics based on type and severity of pain and evaluate response   Implement non-pharmacological measures as appropriate and evaluate response   Notify Licensed Independent Practitioner if interventions unsuccessful or patient reports new pain   Consider cultural and social influences on pain and pain management     Problem: Safety - Adult  Goal: Free from fall injury  Outcome: Met  Flowsheets (Taken 2025)  Free from fall injury:   Instruct family/caregiver on patient safety   Based on caregiver fall risk screen, instruct family/caregiver to ask for assistance with transferring infant if caregiver noted to have fall risk factors     Problem: Discharge Planning  Goal: Discharge to home or other facility with appropriate resources  Outcome: Met  Flowsheets (Taken 2025)  Discharge to home or other facility with appropriate resources:   Identify barriers to discharge with patient and caregiver   Arrange for needed discharge resources and transportation as appropriate   Identify discharge learning needs (meds, wound care, etc)     Problem: Postpartum  Goal: Minimal s/sx  of HDP and BP<160/110  Outcome: Met  Flowsheets (Taken 8/2/2025 1841)  Minimal s/sx of HDP and BP <160/110:   Med administration/monitoring of effect   Monitor QBL and vital signs  Goal: No s/sx infection  Outcome: Met  Flowsheets (Taken 8/2/2025 0516 by Shakila Crump RN)  No s/sx of infection:   Monitor QBL and vital signs   Hygiene practices/tesfaye-care   Fundal + lochia assessments w/fundal massage, bladder emptying, intrauterine device when indicated  Goal: No s/sx of hemorrhage  Outcome: Met  Flowsheets (Taken 8/2/2025 1841)  No s/sx of hemorrhage:   Monitor QBL and vital signs   Fundal + lochia assessments w/fundal massage, bladder emptying, intrauterine device when indicated

## 2025-08-03 NOTE — NURSING NOTE
Discussed with patient & sig other BP monitoring at home. Patient assessed and meets criteria with diagnosis chronic hypertension or any previous hypertension issues. Pt has cuff at home. Demo sheet completed. Home monitoring log reviewed with patient prior to discharge. Pt verbalizes understanding of importance in home BP monitoring. Will continue to follow and support until discharge.

## 2025-08-03 NOTE — CARE PLAN
The patient's goals for the shift include to bond with baby    The clinical goals for the shift include to maintain BP below severe range      Problem: Vaginal Birth or  Section  Goal: Minimal s/sx of HDP and BP<160/110  Outcome: Progressing  Flowsheets (Taken 2025 by Sonya Solano RN)  Minimal s/sx of HDP and BP <160/110:   Med administration/monitoring of effect   Monitor QBL and vital signs     Problem: Pain - Adult  Goal: Verbalizes/displays adequate comfort level or baseline comfort level  Outcome: Progressing  Flowsheets (Taken 2025 by Shakila Crump RN)  Verbalizes/displays adequate comfort level or baseline comfort level:   Assess pain using appropriate pain scale   Encourage patient to monitor pain and request assistance   Administer analgesics based on type and severity of pain and evaluate response   Implement non-pharmacological measures as appropriate and evaluate response   Notify Licensed Independent Practitioner if interventions unsuccessful or patient reports new pain   Consider cultural and social influences on pain and pain management     Problem: Safety - Adult  Goal: Free from fall injury  Outcome: Progressing  Flowsheets (Taken 2025 by Sonya Solano RN)  Free from fall injury:   Instruct family/caregiver on patient safety   Based on caregiver fall risk screen, instruct family/caregiver to ask for assistance with transferring infant if caregiver noted to have fall risk factors     Problem: Discharge Planning  Goal: Discharge to home or other facility with appropriate resources  Outcome: Progressing  Flowsheets (Taken 2025 by Sonya Solano RN)  Discharge to home or other facility with appropriate resources:   Identify barriers to discharge with patient and caregiver   Arrange for needed discharge resources and transportation as appropriate   Identify discharge learning needs (meds, wound care, etc)     Problem: Postpartum  Goal: Minimal  s/sx of HDP and BP<160/110  Outcome: Progressing  Flowsheets (Taken 8/2/2025 1841 by Sonya Solano, RN)  Minimal s/sx of HDP and BP <160/110:   Med administration/monitoring of effect   Monitor QBL and vital signs  Goal: No s/sx infection  Outcome: Progressing  Flowsheets (Taken 8/2/2025 0516 by Shakila Crump RN)  No s/sx of infection:   Monitor QBL and vital signs   Hygiene practices/tesfaye-care   Fundal + lochia assessments w/fundal massage, bladder emptying, intrauterine device when indicated  Goal: No s/sx of hemorrhage  Outcome: Progressing  Flowsheets (Taken 8/2/2025 1841 by Sonya Solano RN)  No s/sx of hemorrhage:   Monitor QBL and vital signs   Fundal + lochia assessments w/fundal massage, bladder emptying, intrauterine device when indicated

## 2025-08-06 ENCOUNTER — HOSPITAL ENCOUNTER (OUTPATIENT)
Facility: HOSPITAL | Age: 30
Discharge: HOME | End: 2025-08-06
Attending: OBSTETRICS & GYNECOLOGY | Admitting: OBSTETRICS & GYNECOLOGY
Payer: COMMERCIAL

## 2025-08-06 VITALS
SYSTOLIC BLOOD PRESSURE: 136 MMHG | HEART RATE: 60 BPM | RESPIRATION RATE: 18 BRPM | DIASTOLIC BLOOD PRESSURE: 79 MMHG | TEMPERATURE: 99.5 F | OXYGEN SATURATION: 98 %

## 2025-08-06 DIAGNOSIS — O10.919 CHRONIC HYPERTENSION AFFECTING PREGNANCY (HHS-HCC): ICD-10-CM

## 2025-08-06 LAB
ALBUMIN SERPL BCP-MCNC: 3.7 G/DL (ref 3.4–5)
ALP SERPL-CCNC: 53 U/L (ref 33–110)
ALT SERPL W P-5'-P-CCNC: 31 U/L (ref 7–45)
ANION GAP SERPL CALC-SCNC: 11 MMOL/L (ref 10–20)
AST SERPL W P-5'-P-CCNC: 19 U/L (ref 9–39)
BILIRUB SERPL-MCNC: 0.3 MG/DL (ref 0–1.2)
BUN SERPL-MCNC: 9 MG/DL (ref 6–23)
CALCIUM SERPL-MCNC: 10.3 MG/DL (ref 8.6–10.3)
CHLORIDE SERPL-SCNC: 105 MMOL/L (ref 98–107)
CO2 SERPL-SCNC: 29 MMOL/L (ref 21–32)
CREAT SERPL-MCNC: 0.66 MG/DL (ref 0.5–1.05)
EGFRCR SERPLBLD CKD-EPI 2021: >90 ML/MIN/1.73M*2
ERYTHROCYTE [DISTWIDTH] IN BLOOD BY AUTOMATED COUNT: 13.6 % (ref 11.5–14.5)
GLUCOSE SERPL-MCNC: 85 MG/DL (ref 74–99)
HCT VFR BLD AUTO: 33.2 % (ref 36–46)
HGB BLD-MCNC: 11 G/DL (ref 12–16)
LABORATORY COMMENT REPORT: NORMAL
LDH SERPL L TO P-CCNC: 151 U/L (ref 84–246)
MCH RBC QN AUTO: 28.1 PG (ref 26–34)
MCHC RBC AUTO-ENTMCNC: 33.1 G/DL (ref 32–36)
MCV RBC AUTO: 85 FL (ref 80–100)
NRBC BLD-RTO: 0 /100 WBCS (ref 0–0)
PATH REPORT.FINAL DX SPEC: NORMAL
PATH REPORT.GROSS SPEC: NORMAL
PATH REPORT.RELEVANT HX SPEC: NORMAL
PATH REPORT.TOTAL CANCER: NORMAL
PLATELET # BLD AUTO: 314 X10*3/UL (ref 150–450)
POTASSIUM SERPL-SCNC: 4.6 MMOL/L (ref 3.5–5.3)
PROT SERPL-MCNC: 6.6 G/DL (ref 6.4–8.2)
RBC # BLD AUTO: 3.92 X10*6/UL (ref 4–5.2)
SODIUM SERPL-SCNC: 140 MMOL/L (ref 136–145)
URATE SERPL-MCNC: 6.8 MG/DL (ref 2.3–6.7)
WBC # BLD AUTO: 10.7 X10*3/UL (ref 4.4–11.3)

## 2025-08-06 PROCEDURE — 84550 ASSAY OF BLOOD/URIC ACID: CPT | Performed by: OBSTETRICS & GYNECOLOGY

## 2025-08-06 PROCEDURE — 99213 OFFICE O/P EST LOW 20 MIN: CPT

## 2025-08-06 PROCEDURE — 83615 LACTATE (LD) (LDH) ENZYME: CPT | Performed by: OBSTETRICS & GYNECOLOGY

## 2025-08-06 PROCEDURE — 36415 COLL VENOUS BLD VENIPUNCTURE: CPT | Performed by: OBSTETRICS & GYNECOLOGY

## 2025-08-06 PROCEDURE — 80053 COMPREHEN METABOLIC PANEL: CPT | Performed by: OBSTETRICS & GYNECOLOGY

## 2025-08-06 PROCEDURE — 85027 COMPLETE CBC AUTOMATED: CPT | Performed by: OBSTETRICS & GYNECOLOGY

## 2025-08-06 RX ORDER — LABETALOL HYDROCHLORIDE 5 MG/ML
20 INJECTION, SOLUTION INTRAVENOUS ONCE AS NEEDED
Status: DISCONTINUED | OUTPATIENT
Start: 2025-08-06 | End: 2025-08-06 | Stop reason: HOSPADM

## 2025-08-06 RX ORDER — METOCLOPRAMIDE HYDROCHLORIDE 5 MG/ML
10 INJECTION INTRAMUSCULAR; INTRAVENOUS ONCE
Status: DISCONTINUED | OUTPATIENT
Start: 2025-08-06 | End: 2025-08-06 | Stop reason: HOSPADM

## 2025-08-06 RX ORDER — DIPHENHYDRAMINE HCL 25 MG
25 TABLET ORAL ONCE
Status: DISCONTINUED | OUTPATIENT
Start: 2025-08-06 | End: 2025-08-06 | Stop reason: HOSPADM

## 2025-08-06 RX ORDER — ONDANSETRON HYDROCHLORIDE 2 MG/ML
4 INJECTION, SOLUTION INTRAVENOUS EVERY 6 HOURS PRN
Status: DISCONTINUED | OUTPATIENT
Start: 2025-08-06 | End: 2025-08-06 | Stop reason: HOSPADM

## 2025-08-06 RX ORDER — DOCUSATE SODIUM 100 MG/1
100 CAPSULE, LIQUID FILLED ORAL 2 TIMES DAILY
COMMUNITY

## 2025-08-06 RX ORDER — LIDOCAINE HYDROCHLORIDE 10 MG/ML
0.5 INJECTION, SOLUTION EPIDURAL; INFILTRATION; INTRACAUDAL; PERINEURAL ONCE AS NEEDED
Status: DISCONTINUED | OUTPATIENT
Start: 2025-08-06 | End: 2025-08-06 | Stop reason: HOSPADM

## 2025-08-06 RX ORDER — FERROUS SULFATE 325(65) MG
325 TABLET, DELAYED RELEASE (ENTERIC COATED) ORAL DAILY
COMMUNITY

## 2025-08-06 RX ORDER — LABETALOL 100 MG/1
200 TABLET, FILM COATED ORAL 2 TIMES DAILY
Qty: 120 TABLET | Refills: 3 | Status: SHIPPED | OUTPATIENT
Start: 2025-08-06

## 2025-08-06 RX ORDER — ONDANSETRON 4 MG/1
4 TABLET, FILM COATED ORAL EVERY 6 HOURS PRN
Status: DISCONTINUED | OUTPATIENT
Start: 2025-08-06 | End: 2025-08-06 | Stop reason: HOSPADM

## 2025-08-06 RX ORDER — DIPHENHYDRAMINE HYDROCHLORIDE 50 MG/ML
25 INJECTION, SOLUTION INTRAMUSCULAR; INTRAVENOUS ONCE
Status: DISCONTINUED | OUTPATIENT
Start: 2025-08-06 | End: 2025-08-06 | Stop reason: HOSPADM

## 2025-08-06 RX ORDER — HYDRALAZINE HYDROCHLORIDE 20 MG/ML
5 INJECTION INTRAMUSCULAR; INTRAVENOUS ONCE AS NEEDED
Status: DISCONTINUED | OUTPATIENT
Start: 2025-08-06 | End: 2025-08-06 | Stop reason: HOSPADM

## 2025-08-06 RX ORDER — METOCLOPRAMIDE 10 MG/1
10 TABLET ORAL ONCE
Status: DISCONTINUED | OUTPATIENT
Start: 2025-08-06 | End: 2025-08-06 | Stop reason: HOSPADM

## 2025-08-06 NOTE — H&P
OB Triage H&P    Assessment/Plan    Karine Ruiz is a 29 y.o.  PPD #5 s/p  c/b cHTN presenting with elevated bps at home and occasional headaches.  BP here 131/60, home bps higher (130-150s/80-90s).  Plan to check HELLP labs, increase PO labetalol to 200 mg BID.  Does not currently have a headache and so does not warrant treatment for her headache or magnesium for seizure ppx.  No current concern for mastitis but will watch area on left breast closely.  Feel her upper back pain which has improved is likely musculoskeletal in nature.  Plan discharge home with precautions as long as HELLP labs normal.  Has bp check with Dr. Thayer on  scheduled for close follow up.    Che Domingo MD    Pregnancy Problems (from 24 to present)       Problem Noted Diagnosed Resolved    High-risk pregnancy in third trimester ([facility]) 2025 by Gaby Thayer MD  No    Priority:  High       Overview Addendum 2025  6:57 PM by Gaby Thayer MD   Discuss again w pt at next appt   High risk preg / to  recommend IOL at 37 weeks - pt last chose 38 wk    BMI 45 now : wt gain 28  lbs / EFW 7 lbs at 36weeks fetal AC > 96 %  / risk of PPH. Csection . PEC          Chronic hypertension affecting pregnancy ([facility]) 2025 by Jeevan Robertson MD  No    Priority:  Medium       Anemia during pregnancy in third trimester 2025 by Gaby Thayer MD  No    Priority:  Medium       Overview Addendum 2025 10:12 AM by Gaby Thayer MD   Added iron RX daily   -pt requested iron infusions x 3  done         Single umbilical artery ([facility]) 4/3/2025 by Gaby Thayer MD  No    Priority:  Medium       Overview Signed 2025  9:05 PM by Gaby Thayer MD   Seen at 20 weeks   Follow up growth scans          Rubella non-immune status, antepartum ([facility]) 2025 by Gaby Thayer MD  No    Priority:  Medium       Overview Signed 2025  2:11 PM by Gaby Thayer MD    Vaccine postpartum          HTN in pregnancy, chronic ([facility]) 1/20/2025 by Gaby Thayer MD  No    Priority:  Medium       Overview Addendum 7/20/2025  6:51 PM by Gaby Thayer MD   CHTN on Labetalol 100 BID   To check home BPs weekly goal is 140 /80s  Pt aware of incr risk Preeclampsia / had it last preg at 39 weeks  PLAN:  32w NST or BPP weekly , deliver at 37 w = pt wants 38 week IOL on July 31 8pm so baby can be born Aug 1  , aware of risks of PEC , to follow closely / does Home BP checks    Growth scans 76 % at  36 w    Home BPs 140s         36 weeks gestation of pregnancy ([facility]) 12/23/2024 by Gaby Thayer MD  No    Priority:  Medium       Overview Addendum 7/29/2025  9:51 AM by Gaby Thayer MD   Desired provider in labor: [] CNM  [x] Physician  [x] Blood Products: [x] Yes, accepts [] No, needs counseling  [x] Initial BMI: 41.86   [x] Prenatal Labs:   [x] Cervical Cancer Screening up to date wnl 8/2024  [x] Rh status: A pos   [x] Genetic Screening:  XX cfDNA wnl, carrier screen neg   [x] NT US: (11-13 wks)  [x] Baby ASA (if indicated):  [x] Pregnancy dated by: LMP    [x] Anatomy US: (19-20 wks)  [x] Federal Sterilization consent signed (if indicated): NA   [x] 1hr GCT at 24-28wks: wnl  [x] Rhogam (if indicated):   No  A pos   [x] Fetal Surveillance (if indicated): weekly NST 32 wk   [x] Tdap (27-32 wks, may be given up to 36 wks if initial window missed):     [x] Breastfeeding:  [] Postpartum Birth control method:   [x] GBS at 36 - 37 wks:  [x] 39 weeks discussion of IOL vs. Expectant management: IOL at 38  wk July 31 8 pm  [x] Mode of delivery ( anticipated ): vag         Class 3 severe obesity with body mass index (BMI) of 40.0 to 44.9 in adult 5/9/2023 by German R Degidio, DO  No    Priority:  Medium       Overview Addendum 5/29/2025  2:26 PM by Gaby Thayer MD   Healthy diet discussed - wt gain goal 20 lbs max   Follow up growth scans and Fetal ECHO = wnl           Decreased fetal movements in third trimester ([facility]) 2025 by Deepti Diaz MD  2025 by Gaby Thayer MD    Priority:  Medium       Overview Signed 2025 12:03 PM by Deepti Diaz MD   Bedside BPP . AMANDA subjectively normal.                  Subjective   PPD #5 s/p  c/b cHTN on meds (labetalol 100 mg BID) presenting to L&D with elevated bps at home.  Bps at home 130-150s/80-90s, no severe range bps but higher than her normal.  Has had what she describes as tension headaches on and off that last for 30-45 seconds and then are gone.  Not taking pain medication for them and resolve spontaneously.  Does not currently have a headache.  Otherwise feeling well.  Has some upper back pain that is worse with movement, was worse yesterday and better today.  Also worried about her left breast as feeling very engorged.     Prenatal Provider Flaca    OB History    Para Term  AB Living   2 2 2 0 0 2   SAB IAB Ectopic Multiple Live Births   0 0 0 0 2      # Outcome Date GA Lbr Phillip/2nd Weight Sex Type Anes PTL Lv   2 Term 24 38w3d 04:49 / 00:05 3.48 kg F Vag-Spont None  MARCI      Name: Anival Ruiz      Apgar1: 8  Apgar5: 9   1 Term 15 39w1d  3.487 kg F Vag-Spont EPI  MARCI      Name: Rose       Surgical History[1]    Social History     Tobacco Use    Smoking status: Never    Smokeless tobacco: Never   Substance Use Topics    Alcohol use: Not Currently     Comment: socially       Allergies[2]    Prescriptions Prior to Admission[3]  Objective     Last Vitals  Temp Pulse Resp BP MAP O2 Sat   37.5 °C (99.5 °F) 59 17 131/60 86 97 %     Blood Pressures         2025  1243 2025  1246          BP: 131/60 131/60               Physical Exam  General: NAD, mood appropriate  Cardiopulmonary: warm and well perfused, breathing comfortably on room air  Abdomen: Gravid, non-tender  Extremities: Symmetric, 2+ LE edema equal and bilateral  Breast: breasts engorged  bilaterally, left breast with area of eyrthema from patient massaging this area (does not look c/w mastitis), no palpable plugged duct              [1]   Past Surgical History:  Procedure Laterality Date    OTHER SURGICAL HISTORY  01/18/2016    Esophagoscopy Rigid    OTHER SURGICAL HISTORY  04/22/2019    [place] tooth extraction    OTHER SURGICAL HISTORY  04/22/2019    Esophagogastroduodenoscopy    WISDOM TOOTH EXTRACTION     [2]   Allergies  Allergen Reactions    Bee Pollen Unknown    Bee Venom Protein (Honey Bee) Unknown    Cat Dander Unknown   [3]   Medications Prior to Admission   Medication Sig Dispense Refill Last Dose/Taking    acetaminophen (Tylenol Extra Strength) 500 mg tablet Take 2 tablets (1,000 mg) by mouth every 6 hours if needed for mild pain (1 - 3).   8/6/2025    albuterol 90 mcg/actuation inhaler Inhale 2 puffs every 6 hours if needed for wheezing.   8/6/2025    cetirizine (ZyrTEC) 10 mg tablet Zyrtec 10 MG TABS   Refills: 0       Active   8/6/2025    docusate sodium (Colace) 100 mg capsule Take 1 capsule (100 mg) by mouth 2 times a day.   8/6/2025    ferrous sulfate 325 (65 Fe) mg EC tablet Take 1 tablet by mouth once daily. Do not crush, chew, or split.   8/6/2025    ibuprofen (Motrin IB) 200 mg tablet Take 3 tablets (600 mg) by mouth every 6 hours if needed for mild pain (1 - 3).   8/6/2025 Noon    labetalol (Normodyne) 100 mg tablet Take 1 tablet (100 mg) by mouth 2 times a day. 60 tablet 3 8/6/2025 Morning    pantoprazole (ProtoNix) 20 mg EC tablet TAKE 1 TABLET DAILY 90 tablet 3 8/6/2025    PNV no.103/folic/om3s/fish oil (PRENATAL WITH DHA-FOLIC ACID ORAL) Take by mouth once daily.   8/6/2025

## 2025-08-06 NOTE — SIGNIFICANT EVENT
HELLP labs are normal.  Labetalol 200 mg BID sent to her pharmacy.  Discussed return precautions including severe range bps at home (>160/110), headache unrelieved with pain medication or any concerns. Discussed if the area on her left breast becomes more red and she feels like she has the flu, would treat for mastitis so should call back.  Otherwise, has follow up with Dr. Thayer on 8/11.  Patient and partner comfortable with discharge home now.    Che Domingo MD

## 2025-08-07 LAB — HOLD SPECIMEN: NORMAL

## 2025-08-11 ENCOUNTER — LACTATION CONSULT (OUTPATIENT)
Dept: LACTATION | Facility: CLINIC | Age: 30
End: 2025-08-11
Payer: COMMERCIAL

## 2025-08-11 ENCOUNTER — APPOINTMENT (OUTPATIENT)
Facility: CLINIC | Age: 30
End: 2025-08-11
Payer: COMMERCIAL

## 2025-08-11 DIAGNOSIS — O92.70 LACTATION PROBLEM (HHS-HCC): Primary | ICD-10-CM

## 2025-08-11 DIAGNOSIS — I10 CHRONIC HYPERTENSION: Primary | ICD-10-CM

## 2025-08-11 PROBLEM — Z3A.36 36 WEEKS GESTATION OF PREGNANCY (HHS-HCC): Status: RESOLVED | Noted: 2024-12-23 | Resolved: 2025-08-11

## 2025-08-11 PROBLEM — Q27.0 SINGLE UMBILICAL ARTERY (HHS-HCC): Status: RESOLVED | Noted: 2025-04-03 | Resolved: 2025-08-11

## 2025-08-11 PROBLEM — O09.93 HIGH-RISK PREGNANCY IN THIRD TRIMESTER (HHS-HCC): Status: RESOLVED | Noted: 2025-07-20 | Resolved: 2025-08-11

## 2025-08-11 PROBLEM — E66.813 CLASS 3 SEVERE OBESITY WITH BODY MASS INDEX (BMI) OF 40.0 TO 44.9 IN ADULT: Status: RESOLVED | Noted: 2023-05-09 | Resolved: 2025-08-11

## 2025-08-11 PROBLEM — O09.899 RUBELLA NON-IMMUNE STATUS, ANTEPARTUM (HHS-HCC): Status: RESOLVED | Noted: 2025-01-23 | Resolved: 2025-08-11

## 2025-08-11 PROBLEM — O10.919 CHRONIC HYPERTENSION AFFECTING PREGNANCY (HHS-HCC): Status: RESOLVED | Noted: 2025-07-31 | Resolved: 2025-08-11

## 2025-08-11 PROBLEM — Z28.39 RUBELLA NON-IMMUNE STATUS, ANTEPARTUM (HHS-HCC): Status: RESOLVED | Noted: 2025-01-23 | Resolved: 2025-08-11

## 2025-08-11 PROCEDURE — 99211 OFF/OP EST MAY X REQ PHY/QHP: CPT | Performed by: EMERGENCY MEDICINE

## 2025-08-11 PROCEDURE — 99213 OFFICE O/P EST LOW 20 MIN: CPT | Performed by: OBSTETRICS & GYNECOLOGY

## 2025-08-11 PROCEDURE — 1036F TOBACCO NON-USER: CPT | Performed by: OBSTETRICS & GYNECOLOGY

## 2025-08-11 ASSESSMENT — ENCOUNTER SYMPTOMS
DEPRESSION: 0
OCCASIONAL FEELINGS OF UNSTEADINESS: 0
LOSS OF SENSATION IN FEET: 0

## 2025-08-12 ENCOUNTER — OFFICE VISIT (OUTPATIENT)
Dept: PRIMARY CARE | Facility: CLINIC | Age: 30
End: 2025-08-12
Payer: COMMERCIAL

## 2025-08-12 VITALS
TEMPERATURE: 97.8 F | BODY MASS INDEX: 42.4 KG/M2 | HEART RATE: 65 BPM | RESPIRATION RATE: 18 BRPM | SYSTOLIC BLOOD PRESSURE: 130 MMHG | WEIGHT: 247 LBS | OXYGEN SATURATION: 97 % | DIASTOLIC BLOOD PRESSURE: 82 MMHG

## 2025-08-12 DIAGNOSIS — R05.1 ACUTE COUGH: Primary | ICD-10-CM

## 2025-08-12 DIAGNOSIS — J06.9 ACUTE URI: ICD-10-CM

## 2025-08-12 LAB — POC SARS-COV-2 AG BINAX: NORMAL

## 2025-08-12 PROCEDURE — 3075F SYST BP GE 130 - 139MM HG: CPT | Performed by: NURSE PRACTITIONER

## 2025-08-12 PROCEDURE — 1036F TOBACCO NON-USER: CPT | Performed by: NURSE PRACTITIONER

## 2025-08-12 PROCEDURE — 99213 OFFICE O/P EST LOW 20 MIN: CPT | Performed by: NURSE PRACTITIONER

## 2025-08-12 PROCEDURE — 3079F DIAST BP 80-89 MM HG: CPT | Performed by: NURSE PRACTITIONER

## 2025-08-12 PROCEDURE — 87811 SARS-COV-2 COVID19 W/OPTIC: CPT | Performed by: NURSE PRACTITIONER

## 2025-08-12 RX ORDER — METHYLPREDNISOLONE 4 MG/1
TABLET ORAL
Qty: 21 TABLET | Refills: 0 | Status: SHIPPED | OUTPATIENT
Start: 2025-08-12 | End: 2025-08-18

## 2025-08-12 RX ORDER — AZITHROMYCIN 250 MG/1
TABLET, FILM COATED ORAL
Qty: 6 TABLET | Refills: 0 | Status: SHIPPED | OUTPATIENT
Start: 2025-08-12 | End: 2025-08-17

## 2025-08-12 ASSESSMENT — ENCOUNTER SYMPTOMS
DIARRHEA: 0
HEADACHES: 1
CHILLS: 0
FEVER: 0
NAUSEA: 0
SORE THROAT: 0
RHINORRHEA: 1
ACTIVITY CHANGE: 0
SLEEP DISTURBANCE: 1
VOMITING: 0
APPETITE CHANGE: 0
COUGH: 1

## 2025-08-13 ENCOUNTER — LACTATION CONSULT (OUTPATIENT)
Dept: LACTATION | Facility: CLINIC | Age: 30
End: 2025-08-13
Payer: COMMERCIAL

## 2025-08-13 DIAGNOSIS — O92.70 LACTATION PROBLEM (HHS-HCC): Primary | ICD-10-CM

## 2025-08-13 LAB — SARS-COV-2 RNA RESP QL NAA+PROBE: NOT DETECTED

## 2025-08-13 PROCEDURE — 99211 OFF/OP EST MAY X REQ PHY/QHP: CPT | Performed by: EMERGENCY MEDICINE

## 2025-08-18 ENCOUNTER — LACTATION CONSULT (OUTPATIENT)
Dept: LACTATION | Facility: CLINIC | Age: 30
End: 2025-08-18
Payer: COMMERCIAL

## 2025-08-18 ENCOUNTER — APPOINTMENT (OUTPATIENT)
Facility: CLINIC | Age: 30
End: 2025-08-18
Payer: COMMERCIAL

## 2025-08-18 DIAGNOSIS — O92.70 LACTATION PROBLEM (HHS-HCC): Primary | ICD-10-CM

## 2025-08-18 DIAGNOSIS — I10 CHRONIC HYPERTENSION: Primary | ICD-10-CM

## 2025-08-18 PROCEDURE — 1036F TOBACCO NON-USER: CPT | Performed by: OBSTETRICS & GYNECOLOGY

## 2025-08-18 PROCEDURE — 99211 OFF/OP EST MAY X REQ PHY/QHP: CPT | Performed by: EMERGENCY MEDICINE

## 2025-08-18 PROCEDURE — 99213 OFFICE O/P EST LOW 20 MIN: CPT | Performed by: OBSTETRICS & GYNECOLOGY

## 2025-08-18 ASSESSMENT — ENCOUNTER SYMPTOMS
HEADACHES: 1
NECK PAIN: 1
SWEATS: 1
HYPERTENSION: 1

## 2025-08-19 ENCOUNTER — LACTATION CONSULT (OUTPATIENT)
Dept: LACTATION | Facility: CLINIC | Age: 30
End: 2025-08-19
Payer: COMMERCIAL

## 2025-08-19 DIAGNOSIS — O92.70 LACTATION PROBLEM (HHS-HCC): Primary | ICD-10-CM

## 2025-08-19 PROCEDURE — 99211 OFF/OP EST MAY X REQ PHY/QHP: CPT | Performed by: EMERGENCY MEDICINE

## 2025-08-20 ENCOUNTER — LACTATION CONSULT (OUTPATIENT)
Dept: LACTATION | Facility: CLINIC | Age: 30
End: 2025-08-20
Payer: COMMERCIAL

## 2025-08-20 DIAGNOSIS — O92.70 LACTATION PROBLEM (HHS-HCC): Primary | ICD-10-CM

## 2025-08-20 PROCEDURE — 99211 OFF/OP EST MAY X REQ PHY/QHP: CPT | Performed by: EMERGENCY MEDICINE

## 2025-08-21 ENCOUNTER — APPOINTMENT (OUTPATIENT)
Dept: OBSTETRICS AND GYNECOLOGY | Facility: CLINIC | Age: 30
End: 2025-08-21
Payer: COMMERCIAL

## 2025-08-22 ENCOUNTER — LACTATION CONSULT (OUTPATIENT)
Dept: LACTATION | Facility: CLINIC | Age: 30
End: 2025-08-22
Payer: COMMERCIAL

## 2025-08-22 DIAGNOSIS — O92.70 LACTATION PROBLEM (HHS-HCC): Primary | ICD-10-CM

## 2025-08-22 PROCEDURE — 99211 OFF/OP EST MAY X REQ PHY/QHP: CPT | Performed by: EMERGENCY MEDICINE

## 2025-08-26 ENCOUNTER — LACTATION CONSULT (OUTPATIENT)
Dept: LACTATION | Facility: CLINIC | Age: 30
End: 2025-08-26
Payer: COMMERCIAL

## 2025-08-26 DIAGNOSIS — O92.70 LACTATION PROBLEM (HHS-HCC): Primary | ICD-10-CM

## 2025-08-26 PROCEDURE — 99211 OFF/OP EST MAY X REQ PHY/QHP: CPT | Performed by: EMERGENCY MEDICINE

## 2025-08-29 ENCOUNTER — LACTATION CONSULT (OUTPATIENT)
Dept: LACTATION | Facility: CLINIC | Age: 30
End: 2025-08-29
Payer: COMMERCIAL

## 2025-08-29 DIAGNOSIS — O92.70 LACTATION PROBLEM (HHS-HCC): Primary | ICD-10-CM

## 2025-08-29 PROCEDURE — 99211 OFF/OP EST MAY X REQ PHY/QHP: CPT | Performed by: EMERGENCY MEDICINE

## 2025-09-03 ENCOUNTER — APPOINTMENT (OUTPATIENT)
Dept: LACTATION | Facility: CLINIC | Age: 30
End: 2025-09-03
Payer: COMMERCIAL

## 2025-09-16 ENCOUNTER — APPOINTMENT (OUTPATIENT)
Facility: CLINIC | Age: 30
End: 2025-09-16
Payer: COMMERCIAL